# Patient Record
Sex: MALE | Race: WHITE | NOT HISPANIC OR LATINO | Employment: OTHER | RURAL
[De-identification: names, ages, dates, MRNs, and addresses within clinical notes are randomized per-mention and may not be internally consistent; named-entity substitution may affect disease eponyms.]

---

## 2017-01-05 ENCOUNTER — CONVERSION ENCOUNTER (OUTPATIENT)
Dept: FAMILY MEDICINE CLINIC | Facility: CLINIC | Age: 82
End: 2017-01-05

## 2017-01-06 LAB
ALBUMIN SERPL-MCNC: 4.2 G/DL (ref 3.6–5.1)
ALP SERPL-CCNC: 86 U/L (ref 40–115)
ALT SERPL-CCNC: 9 U/L (ref 9–46)
AST SERPL-CCNC: 16 U/L (ref 10–35)
BASOPHILS # BLD AUTO: 25 CELLS/UL (ref 0–200)
BASOPHILS NFR BLD AUTO: 0.4 %
BILIRUB SERPL-MCNC: 0.5 MG/DL (ref 0.2–1.2)
BUN SERPL-MCNC: 19 MG/DL (ref 7–25)
BUN/CREAT SERPL: 13 (CALC) (ref 6–22)
CALCIUM SERPL-MCNC: 9.2 MG/DL (ref 8.6–10.3)
CHLORIDE SERPL-SCNC: 102 MMOL/L (ref 98–110)
CHOLEST SERPL-MCNC: 180 MG/DL (ref 125–200)
CHOLEST/HDLC SERPL: 5.6 (CALC)
CONV CO2: 31 MMOL/L (ref 20–31)
CONV TOTAL PROTEIN: 7.4 G/DL (ref 6.1–8.1)
CREAT UR-MCNC: 1.48 MG/DL (ref 0.7–1.11)
EOSINOPHIL # BLD AUTO: 202 CELLS/UL (ref 15–500)
EOSINOPHIL # BLD AUTO: 3.2 %
ERYTHROCYTE [DISTWIDTH] IN BLOOD BY AUTOMATED COUNT: 13.9 % (ref 11–15)
GLOBULIN UR ELPH-MCNC: 3.2 MG/DL (ref 1.9–3.7)
GLUCOSE UR QL: 84 MG/DL (ref 65–99)
HBA1C MFR BLD: 5.8 %
HCT VFR BLD AUTO: 38.5 % (ref 38.5–50)
HDLC SERPL-MCNC: 32 MG/DL
HGB BLD-MCNC: 13.1 G/DL (ref 13.2–17.1)
INSULIN SERPL-ACNC: 1.3 (CALC) (ref 1–2.5)
LDLC SERPL CALC-MCNC: 101 MG/DL
LYMPHOCYTES # BLD AUTO: 1777 CELLS/UL (ref 850–3900)
LYMPHOCYTES NFR BLD AUTO: 28.2 %
MCH RBC QN AUTO: 32.9 PG (ref 27–33)
MCHC RBC AUTO-ENTMCNC: 34.1 G/DL (ref 32–36)
MCV RBC AUTO: 96.4 FL (ref 80–100)
MONOCYTES # BLD AUTO: 554 CELLS/UL (ref 200–950)
MONOCYTES NFR BLD AUTO: 8.8 %
NEUTROPHILS # BLD AUTO: 3742 CELLS/UL (ref 1500–7800)
NEUTROPHILS NFR BLD AUTO: 59.4 %
NONHDLC SERPL-MCNC: 148 MG/DL
PLATELET # BLD AUTO: 150 10*3/UL (ref 140–400)
PMV BLD AUTO: 7.9 FL (ref 7.5–11.5)
POTASSIUM SERPL-SCNC: 4.2 MMOL/L (ref 3.5–5.3)
RBC # BLD AUTO: 4 MILLION/UL (ref 4.2–5.8)
SODIUM SERPL-SCNC: 140 MMOL/L (ref 135–146)
TRIGL SERPL-MCNC: 234 MG/DL
WBC # BLD AUTO: 6.3 10*3/UL (ref 3.8–10.8)

## 2017-04-13 ENCOUNTER — CONVERSION ENCOUNTER (OUTPATIENT)
Dept: FAMILY MEDICINE CLINIC | Facility: CLINIC | Age: 82
End: 2017-04-13

## 2017-04-14 LAB
ALBUMIN SERPL-MCNC: 3.6 G/DL (ref 3.6–5.1)
ALP SERPL-CCNC: 68 U/L (ref 40–115)
AST SERPL-CCNC: 19 U/L (ref 10–35)
BILIRUB SERPL-MCNC: 0.4 MG/DL (ref 0.2–1.2)
BUN SERPL-MCNC: 23 MG/DL (ref 7–25)
BUN/CREAT SERPL: 15.3 (CALC) (ref 6–22)
CALCIUM SERPL-MCNC: 9.4 MG/DL (ref 8.6–10.2)
CHLORIDE SERPL-SCNC: 105 MMOL/L (ref 98–110)
CHOLEST SERPL-MCNC: 169 MG/DL (ref 125–200)
CONV CO2: 29 MMOL/L (ref 21–33)
CONV TOTAL PROTEIN: 6.3 G/DL (ref 6.2–8.3)
CREAT UR-MCNC: 1.5 MG/DL (ref 0.67–1.54)
EOSINOPHIL # BLD AUTO: 100 CELLS/UL (ref 15–500)
EOSINOPHIL # BLD AUTO: 2 %
ERYTHROCYTE [DISTWIDTH] IN BLOOD BY AUTOMATED COUNT: 13.9 % (ref 11–15)
GLOBULIN UR ELPH-MCNC: 2.7 MG/DL (ref 2.1–3.7)
GLUCOSE UR QL: 88 MG/DL (ref 65–99)
HCT VFR BLD AUTO: 36.2 % (ref 38.5–50)
HDLC SERPL-MCNC: 33 MG/DL
HGB BLD-MCNC: 12.5 G/DL (ref 13.2–17.1)
INSULIN SERPL-ACNC: 1.3 (CALC) (ref 1–2.1)
LDLC SERPL CALC-MCNC: 91 MG/DL
LYMPHOCYTES # BLD AUTO: 1800 CELLS/UL (ref 850–3900)
LYMPHOCYTES NFR BLD AUTO: 32 %
MCH RBC QN AUTO: 32.8 PG (ref 27–33)
MCHC RBC AUTO-ENTMCNC: 34.4 G/DL (ref 32–36)
MCV RBC AUTO: 95.5 FL (ref 80–100)
MONOCYTES # BLD AUTO: 600 CELLS/UL (ref 200–950)
MONOCYTES NFR BLD AUTO: 10 %
NEUTROPHILS # BLD AUTO: 3000 CELLS/UL (ref 1500–7800)
NEUTROPHILS NFR BLD AUTO: 55 %
PLATELET # BLD AUTO: 165 10*3/UL (ref 140–400)
PMV BLD AUTO: 8.5 FL (ref 7.5–11.5)
POTASSIUM SERPL-SCNC: 4.2 MMOL/L (ref 3.5–5.3)
PSA SERPL-MCNC: 11.2 NG/ML
RBC # BLD AUTO: 3.8 MILLION/UL (ref 4.2–5.8)
SODIUM SERPL-SCNC: 145 MMOL/L (ref 135–146)
TRIGL SERPL-MCNC: 227 MG/DL
WBC # BLD AUTO: 5.5 10*3/UL (ref 3.8–10.8)

## 2017-06-22 ENCOUNTER — APPOINTMENT (OUTPATIENT)
Dept: PREADMISSION TESTING | Facility: HOSPITAL | Age: 82
End: 2017-06-22

## 2017-06-22 VITALS
DIASTOLIC BLOOD PRESSURE: 70 MMHG | HEART RATE: 64 BPM | TEMPERATURE: 97.6 F | BODY MASS INDEX: 26.22 KG/M2 | SYSTOLIC BLOOD PRESSURE: 113 MMHG | WEIGHT: 173 LBS | HEIGHT: 68 IN | OXYGEN SATURATION: 98 % | RESPIRATION RATE: 20 BRPM

## 2017-06-22 LAB
ANION GAP SERPL CALCULATED.3IONS-SCNC: 13.9 MMOL/L
BUN BLD-MCNC: 21 MG/DL (ref 8–23)
BUN/CREAT SERPL: 15.6 (ref 7–25)
CALCIUM SPEC-SCNC: 9.6 MG/DL (ref 8.2–9.6)
CHLORIDE SERPL-SCNC: 101 MMOL/L (ref 98–107)
CO2 SERPL-SCNC: 26.1 MMOL/L (ref 22–29)
CREAT BLD-MCNC: 1.35 MG/DL (ref 0.76–1.27)
DEPRECATED RDW RBC AUTO: 48.8 FL (ref 37–54)
ERYTHROCYTE [DISTWIDTH] IN BLOOD BY AUTOMATED COUNT: 13.7 % (ref 11.5–14.5)
GFR SERPL CREATININE-BSD FRML MDRD: 49 ML/MIN/1.73
GLUCOSE BLD-MCNC: 73 MG/DL (ref 65–99)
HCT VFR BLD AUTO: 36.4 % (ref 40.4–52.2)
HGB BLD-MCNC: 12.5 G/DL (ref 13.7–17.6)
MCH RBC QN AUTO: 33.4 PG (ref 27–32.7)
MCHC RBC AUTO-ENTMCNC: 34.3 G/DL (ref 32.6–36.4)
MCV RBC AUTO: 97.3 FL (ref 79.8–96.2)
PLATELET # BLD AUTO: 212 10*3/MM3 (ref 140–500)
PMV BLD AUTO: 9.3 FL (ref 6–12)
POTASSIUM BLD-SCNC: 4.2 MMOL/L (ref 3.5–5.2)
RBC # BLD AUTO: 3.74 10*6/MM3 (ref 4.6–6)
SODIUM BLD-SCNC: 141 MMOL/L (ref 136–145)
WBC NRBC COR # BLD: 5.84 10*3/MM3 (ref 4.5–10.7)

## 2017-06-22 PROCEDURE — 36415 COLL VENOUS BLD VENIPUNCTURE: CPT

## 2017-06-22 PROCEDURE — 85027 COMPLETE CBC AUTOMATED: CPT | Performed by: OTOLARYNGOLOGY

## 2017-06-22 PROCEDURE — 93010 ELECTROCARDIOGRAM REPORT: CPT | Performed by: INTERNAL MEDICINE

## 2017-06-22 PROCEDURE — 93005 ELECTROCARDIOGRAM TRACING: CPT

## 2017-06-22 PROCEDURE — 80048 BASIC METABOLIC PNL TOTAL CA: CPT | Performed by: OTOLARYNGOLOGY

## 2017-06-22 RX ORDER — CARBOXYMETHYLCELLULOSE SODIUM 5 MG/ML
1 SOLUTION/ DROPS OPHTHALMIC 2 TIMES DAILY PRN
COMMUNITY
End: 2021-08-25

## 2017-06-22 RX ORDER — ROSUVASTATIN CALCIUM 40 MG/1
40 TABLET, COATED ORAL 2 TIMES WEEKLY
COMMUNITY
End: 2020-02-13 | Stop reason: SDUPTHER

## 2017-06-22 RX ORDER — ASPIRIN 81 MG/1
81 TABLET ORAL EVERY MORNING
COMMUNITY
End: 2017-06-29 | Stop reason: HOSPADM

## 2017-06-22 RX ORDER — FLUTICASONE PROPIONATE 50 MCG
2 SPRAY, SUSPENSION (ML) NASAL EVERY MORNING
COMMUNITY
End: 2022-01-01 | Stop reason: SDUPTHER

## 2017-06-22 RX ORDER — PANTOPRAZOLE SODIUM 40 MG/1
40 TABLET, DELAYED RELEASE ORAL EVERY MORNING
COMMUNITY
End: 2019-11-04 | Stop reason: SDUPTHER

## 2017-06-22 RX ORDER — HYDROCHLOROTHIAZIDE 50 MG/1
50 TABLET ORAL EVERY MORNING
COMMUNITY
End: 2020-01-01

## 2017-06-22 RX ORDER — DIVALPROEX SODIUM 125 MG/1
125 TABLET, DELAYED RELEASE ORAL EVERY MORNING
COMMUNITY
End: 2019-09-08 | Stop reason: SDUPTHER

## 2017-06-22 RX ORDER — MONTELUKAST SODIUM 10 MG/1
10 TABLET ORAL EVERY MORNING
COMMUNITY
End: 2020-02-13 | Stop reason: SDUPTHER

## 2017-06-22 RX ORDER — QUETIAPINE FUMARATE 25 MG/1
25 TABLET, FILM COATED ORAL NIGHTLY
COMMUNITY
End: 2019-10-07 | Stop reason: SDUPTHER

## 2017-06-22 RX ORDER — GALANTAMINE HYDROBROMIDE 4 MG/1
4 TABLET, FILM COATED ORAL 2 TIMES DAILY
COMMUNITY
End: 2020-01-01

## 2017-06-22 RX ORDER — MEMANTINE HYDROCHLORIDE 5 MG/1
5 TABLET ORAL 2 TIMES DAILY
COMMUNITY
End: 2020-01-01

## 2017-06-22 RX ORDER — POTASSIUM CHLORIDE 750 MG/1
10 TABLET, FILM COATED, EXTENDED RELEASE ORAL EVERY MORNING
COMMUNITY
End: 2020-02-13 | Stop reason: SDUPTHER

## 2017-06-22 NOTE — DISCHARGE INSTRUCTIONS
Take the following medications the morning of surgery with a small sip of water:  Protonix, metoprolol,        General Instructions:  • Do not eat solid food after midnight the night before surgery.  • You may drink clear liquids day of surgery but must stop at least one hour before your hospital arrival time.  • It is beneficial for you to have a clear drink that contains carbohydrates the day of surgery.  We suggest a 20 ounce bottle of Gatorade or Powerade for non-diabetic patients or a 20 ounce bottle of G2 or Powerade Zero for diabetic patients. (Pediatric patients, are not advised to drink a 20 ounce carbohydrate drink)    Clear liquids are liquids you can see through.  Nothing red in color.     Plain water                               Sports drinks  Sodas                                   Gelatin (Jell-O)  Fruit juices without pulp such as white grape juice and apple juice  Popsicles that contain no fruit or yogurt  Tea or coffee (no cream or milk added)  Gatorade / Powerade  G2 / Powerade Zero    • Infants may have breast milk up to four hours before surgery.  • Infants drinking formula may drink formula up to six hours before surgery.   • Patients who avoid smoking, chewing tobacco and alcohol for 4 weeks prior to surgery have a reduced risk of post-operative complications.  Quit smoking as many days before surgery as you can.  • Do not smoke, use chewing tobacco or drink alcohol the day of surgery.   • If applicable bring your C-PAP/ BI-PAP machine.  • Bring any papers given to you in the doctor’s office.  • Wear clean comfortable clothes and socks.  • Do not wear contact lenses or make-up.  Bring a case for your glasses.   • Bring crutches or walker if applicable.  • Leave all other valuables and jewelry at home.  • The Pre-Admission Testing nurse will instruct you to bring medications if unable to obtain an accurate list in Pre-Admission Testing.        If you were given a blood bank ID arm band  remember to bring it with you the day of surgery.    Preventing a Surgical Site Infection:  • For 2 to 3 days before surgery, avoid shaving with a razor because the razor can irritate skin and make it easier to develop an infection.  • The night prior to surgery sleep in a clean bed with clean clothing.  Do not allow pets to sleep with you.  • Shower on the morning of surgery using a fresh bar of anti-bacterial soap (such as Dial) and clean washcloth.  Dry with a clean towel and dress in clean clothing.  • Ask your surgeon if you will be receiving antibiotics prior to surgery.  • Make sure you, your family, and all healthcare providers clean their hands with soap and water or an alcohol based hand  before caring for you or your wound.    Day of surgery:  Upon arrival, a Pre-op nurse and Anesthesiologist will review your health history, obtain vital signs, and answer questions you may have.  The only belongings needed at this time will be your home medications and if applicable your C-PAP/BI-PAP machine.  If you are staying overnight your family can leave the rest of your belongings in the car and bring them to your room later.  A Pre-op nurse will start an IV and you may receive medication in preparation for surgery, including something to help you relax.  Your family will be able to see you in the Pre-op area.  While you are in surgery your family should notify the waiting room  if they leave the waiting room area and provide a contact phone number.    Please be aware that surgery does come with discomfort.  We want to make every effort to control your discomfort so please discuss any uncontrolled symptoms with your nurse.   Your doctor will most likely have prescribed pain medications.      If you are going home after surgery you will receive individualized written care instructions before being discharged.  A responsible adult must drive you to and from the hospital on the day of your surgery  and stay with you for 24 hours.    If you are staying overnight following surgery, you will be transported to your hospital room following the recovery period.  [facility] has all private rooms.    If you have any questions please call Pre-Admission Testing at [phone].  Deductibles and co-payments are collected on the day of service. Please be prepared to pay the required co-pay, deductible or deposit on the day of service as defined by your plan.

## 2017-06-29 ENCOUNTER — HOSPITAL ENCOUNTER (OUTPATIENT)
Facility: HOSPITAL | Age: 82
Setting detail: HOSPITAL OUTPATIENT SURGERY
Discharge: HOME | End: 2017-06-29
Attending: OTOLARYNGOLOGY | Admitting: OTOLARYNGOLOGY

## 2017-06-29 ENCOUNTER — ANESTHESIA EVENT (OUTPATIENT)
Dept: PERIOP | Facility: HOSPITAL | Age: 82
End: 2017-06-29

## 2017-06-29 ENCOUNTER — ANESTHESIA (OUTPATIENT)
Dept: PERIOP | Facility: HOSPITAL | Age: 82
End: 2017-06-29

## 2017-06-29 VITALS
TEMPERATURE: 97 F | HEART RATE: 62 BPM | DIASTOLIC BLOOD PRESSURE: 78 MMHG | RESPIRATION RATE: 18 BRPM | SYSTOLIC BLOOD PRESSURE: 122 MMHG | OXYGEN SATURATION: 93 %

## 2017-06-29 DIAGNOSIS — C44.91 RECURRENT BASAL CELL CARCINOMA: ICD-10-CM

## 2017-06-29 PROCEDURE — 25010000002 PROPOFOL 10 MG/ML EMULSION: Performed by: NURSE ANESTHETIST, CERTIFIED REGISTERED

## 2017-06-29 PROCEDURE — 88332 PATH CONSLTJ SURG EA ADD BLK: CPT | Performed by: OTOLARYNGOLOGY

## 2017-06-29 PROCEDURE — 25010000002 ONDANSETRON PER 1 MG: Performed by: NURSE ANESTHETIST, CERTIFIED REGISTERED

## 2017-06-29 PROCEDURE — 88331 PATH CONSLTJ SURG 1 BLK 1SPC: CPT | Performed by: OTOLARYNGOLOGY

## 2017-06-29 PROCEDURE — 25010000002 PHENYLEPHRINE PER 1 ML: Performed by: NURSE ANESTHETIST, CERTIFIED REGISTERED

## 2017-06-29 PROCEDURE — 88305 TISSUE EXAM BY PATHOLOGIST: CPT | Performed by: OTOLARYNGOLOGY

## 2017-06-29 RX ORDER — ROCURONIUM BROMIDE 10 MG/ML
INJECTION, SOLUTION INTRAVENOUS AS NEEDED
Status: DISCONTINUED | OUTPATIENT
Start: 2017-06-29 | End: 2017-06-29 | Stop reason: SURG

## 2017-06-29 RX ORDER — HYDROCODONE BITARTRATE AND ACETAMINOPHEN 7.5; 325 MG/1; MG/1
1 TABLET ORAL ONCE AS NEEDED
Status: DISCONTINUED | OUTPATIENT
Start: 2017-06-29 | End: 2017-06-29 | Stop reason: HOSPADM

## 2017-06-29 RX ORDER — LIDOCAINE HYDROCHLORIDE 20 MG/ML
INJECTION, SOLUTION INFILTRATION; PERINEURAL AS NEEDED
Status: DISCONTINUED | OUTPATIENT
Start: 2017-06-29 | End: 2017-06-29 | Stop reason: SURG

## 2017-06-29 RX ORDER — FAMOTIDINE 10 MG/ML
20 INJECTION, SOLUTION INTRAVENOUS ONCE
Status: COMPLETED | OUTPATIENT
Start: 2017-06-29 | End: 2017-06-29

## 2017-06-29 RX ORDER — FENTANYL CITRATE 50 UG/ML
50 INJECTION, SOLUTION INTRAMUSCULAR; INTRAVENOUS
Status: DISCONTINUED | OUTPATIENT
Start: 2017-06-29 | End: 2017-06-29 | Stop reason: HOSPADM

## 2017-06-29 RX ORDER — PROPOFOL 10 MG/ML
VIAL (ML) INTRAVENOUS AS NEEDED
Status: DISCONTINUED | OUTPATIENT
Start: 2017-06-29 | End: 2017-06-29 | Stop reason: SURG

## 2017-06-29 RX ORDER — NALOXONE HCL 0.4 MG/ML
0.2 VIAL (ML) INJECTION AS NEEDED
Status: DISCONTINUED | OUTPATIENT
Start: 2017-06-29 | End: 2017-06-29 | Stop reason: HOSPADM

## 2017-06-29 RX ORDER — FENTANYL CITRATE 50 UG/ML
25 INJECTION, SOLUTION INTRAMUSCULAR; INTRAVENOUS
Status: DISCONTINUED | OUTPATIENT
Start: 2017-06-29 | End: 2017-06-29 | Stop reason: HOSPADM

## 2017-06-29 RX ORDER — EPHEDRINE SULFATE 50 MG/ML
INJECTION, SOLUTION INTRAVENOUS AS NEEDED
Status: DISCONTINUED | OUTPATIENT
Start: 2017-06-29 | End: 2017-06-29 | Stop reason: SURG

## 2017-06-29 RX ORDER — DIPHENHYDRAMINE HYDROCHLORIDE 50 MG/ML
12.5 INJECTION INTRAMUSCULAR; INTRAVENOUS
Status: DISCONTINUED | OUTPATIENT
Start: 2017-06-29 | End: 2017-06-29 | Stop reason: HOSPADM

## 2017-06-29 RX ORDER — PROMETHAZINE HYDROCHLORIDE 25 MG/1
6.25 TABLET ORAL ONCE AS NEEDED
Status: DISCONTINUED | OUTPATIENT
Start: 2017-06-29 | End: 2017-06-29 | Stop reason: HOSPADM

## 2017-06-29 RX ORDER — ERYTHROMYCIN 5 MG/G
OINTMENT OPHTHALMIC AS NEEDED
Status: DISCONTINUED | OUTPATIENT
Start: 2017-06-29 | End: 2017-06-29 | Stop reason: HOSPADM

## 2017-06-29 RX ORDER — LABETALOL HYDROCHLORIDE 5 MG/ML
5 INJECTION, SOLUTION INTRAVENOUS
Status: DISCONTINUED | OUTPATIENT
Start: 2017-06-29 | End: 2017-06-29 | Stop reason: HOSPADM

## 2017-06-29 RX ORDER — MAGNESIUM HYDROXIDE 1200 MG/15ML
LIQUID ORAL AS NEEDED
Status: DISCONTINUED | OUTPATIENT
Start: 2017-06-29 | End: 2017-06-29 | Stop reason: HOSPADM

## 2017-06-29 RX ORDER — ERYTHROMYCIN 5 MG/G
OINTMENT OPHTHALMIC
Qty: 3.5 G | Refills: 1 | Status: SHIPPED | OUTPATIENT
Start: 2017-06-29 | End: 2021-08-25

## 2017-06-29 RX ORDER — SODIUM CHLORIDE 0.9 % (FLUSH) 0.9 %
1-10 SYRINGE (ML) INJECTION AS NEEDED
Status: DISCONTINUED | OUTPATIENT
Start: 2017-06-29 | End: 2017-06-29 | Stop reason: HOSPADM

## 2017-06-29 RX ORDER — PROMETHAZINE HYDROCHLORIDE 25 MG/1
12.5 TABLET ORAL ONCE AS NEEDED
Status: DISCONTINUED | OUTPATIENT
Start: 2017-06-29 | End: 2017-06-29 | Stop reason: HOSPADM

## 2017-06-29 RX ORDER — EPHEDRINE SULFATE 50 MG/ML
5 INJECTION, SOLUTION INTRAVENOUS ONCE AS NEEDED
Status: DISCONTINUED | OUTPATIENT
Start: 2017-06-29 | End: 2017-06-29 | Stop reason: HOSPADM

## 2017-06-29 RX ORDER — HYDROCODONE BITARTRATE AND ACETAMINOPHEN 5; 325 MG/1; MG/1
1 TABLET ORAL EVERY 6 HOURS PRN
Qty: 15 TABLET | Refills: 0 | Status: SHIPPED | OUTPATIENT
Start: 2017-06-29 | End: 2021-04-01

## 2017-06-29 RX ORDER — GLYCOPYRROLATE 0.2 MG/ML
INJECTION INTRAMUSCULAR; INTRAVENOUS AS NEEDED
Status: DISCONTINUED | OUTPATIENT
Start: 2017-06-29 | End: 2017-06-29 | Stop reason: SURG

## 2017-06-29 RX ORDER — SODIUM CHLORIDE, SODIUM LACTATE, POTASSIUM CHLORIDE, CALCIUM CHLORIDE 600; 310; 30; 20 MG/100ML; MG/100ML; MG/100ML; MG/100ML
9 INJECTION, SOLUTION INTRAVENOUS CONTINUOUS
Status: DISCONTINUED | OUTPATIENT
Start: 2017-06-29 | End: 2017-06-29 | Stop reason: HOSPADM

## 2017-06-29 RX ORDER — ONDANSETRON 2 MG/ML
4 INJECTION INTRAMUSCULAR; INTRAVENOUS ONCE AS NEEDED
Status: DISCONTINUED | OUTPATIENT
Start: 2017-06-29 | End: 2017-06-29 | Stop reason: HOSPADM

## 2017-06-29 RX ORDER — PROMETHAZINE HYDROCHLORIDE 25 MG/ML
6.25 INJECTION, SOLUTION INTRAMUSCULAR; INTRAVENOUS ONCE AS NEEDED
Status: DISCONTINUED | OUTPATIENT
Start: 2017-06-29 | End: 2017-06-29 | Stop reason: HOSPADM

## 2017-06-29 RX ORDER — HYDROMORPHONE HYDROCHLORIDE 1 MG/ML
0.5 INJECTION, SOLUTION INTRAMUSCULAR; INTRAVENOUS; SUBCUTANEOUS
Status: DISCONTINUED | OUTPATIENT
Start: 2017-06-29 | End: 2017-06-29 | Stop reason: HOSPADM

## 2017-06-29 RX ORDER — FLUMAZENIL 0.1 MG/ML
0.2 INJECTION INTRAVENOUS AS NEEDED
Status: DISCONTINUED | OUTPATIENT
Start: 2017-06-29 | End: 2017-06-29 | Stop reason: HOSPADM

## 2017-06-29 RX ORDER — PROMETHAZINE HYDROCHLORIDE 25 MG/ML
5 INJECTION, SOLUTION INTRAMUSCULAR; INTRAVENOUS
Status: DISCONTINUED | OUTPATIENT
Start: 2017-06-29 | End: 2017-06-29 | Stop reason: HOSPADM

## 2017-06-29 RX ORDER — HYDRALAZINE HYDROCHLORIDE 20 MG/ML
5 INJECTION INTRAMUSCULAR; INTRAVENOUS
Status: DISCONTINUED | OUTPATIENT
Start: 2017-06-29 | End: 2017-06-29 | Stop reason: HOSPADM

## 2017-06-29 RX ORDER — MIDAZOLAM HYDROCHLORIDE 1 MG/ML
2 INJECTION INTRAMUSCULAR; INTRAVENOUS
Status: DISCONTINUED | OUTPATIENT
Start: 2017-06-29 | End: 2017-06-29 | Stop reason: HOSPADM

## 2017-06-29 RX ORDER — MIDAZOLAM HYDROCHLORIDE 1 MG/ML
1 INJECTION INTRAMUSCULAR; INTRAVENOUS
Status: DISCONTINUED | OUTPATIENT
Start: 2017-06-29 | End: 2017-06-29 | Stop reason: HOSPADM

## 2017-06-29 RX ORDER — ONDANSETRON 2 MG/ML
INJECTION INTRAMUSCULAR; INTRAVENOUS AS NEEDED
Status: DISCONTINUED | OUTPATIENT
Start: 2017-06-29 | End: 2017-06-29 | Stop reason: SURG

## 2017-06-29 RX ORDER — PROMETHAZINE HYDROCHLORIDE 12.5 MG/1
6.25 SUPPOSITORY RECTAL ONCE AS NEEDED
Status: DISCONTINUED | OUTPATIENT
Start: 2017-06-29 | End: 2017-06-29 | Stop reason: HOSPADM

## 2017-06-29 RX ADMIN — GLYCOPYRROLATE 0.4 MG: 0.2 INJECTION INTRAMUSCULAR; INTRAVENOUS at 10:53

## 2017-06-29 RX ADMIN — LIDOCAINE HYDROCHLORIDE 60 MG: 20 INJECTION, SOLUTION INFILTRATION; PERINEURAL at 09:56

## 2017-06-29 RX ADMIN — PHENYLEPHRINE HYDROCHLORIDE 100 MCG: 10 INJECTION INTRAVENOUS at 10:30

## 2017-06-29 RX ADMIN — PHENYLEPHRINE HYDROCHLORIDE 100 MCG: 10 INJECTION INTRAVENOUS at 11:30

## 2017-06-29 RX ADMIN — PROPOFOL 100 MG: 10 INJECTION, EMULSION INTRAVENOUS at 09:56

## 2017-06-29 RX ADMIN — PHENYLEPHRINE HYDROCHLORIDE 100 MCG: 10 INJECTION INTRAVENOUS at 10:10

## 2017-06-29 RX ADMIN — FAMOTIDINE 20 MG: 10 INJECTION, SOLUTION INTRAVENOUS at 08:41

## 2017-06-29 RX ADMIN — PHENYLEPHRINE HYDROCHLORIDE 100 MCG: 10 INJECTION INTRAVENOUS at 11:20

## 2017-06-29 RX ADMIN — ONDANSETRON 4 MG: 2 INJECTION INTRAMUSCULAR; INTRAVENOUS at 11:05

## 2017-06-29 RX ADMIN — PHENYLEPHRINE HYDROCHLORIDE 100 MCG: 10 INJECTION INTRAVENOUS at 11:10

## 2017-06-29 RX ADMIN — PHENYLEPHRINE HYDROCHLORIDE 100 MCG: 10 INJECTION INTRAVENOUS at 10:45

## 2017-06-29 RX ADMIN — SODIUM CHLORIDE, POTASSIUM CHLORIDE, SODIUM LACTATE AND CALCIUM CHLORIDE 9 ML/HR: 600; 310; 30; 20 INJECTION, SOLUTION INTRAVENOUS at 08:19

## 2017-06-29 RX ADMIN — PHENYLEPHRINE HYDROCHLORIDE 100 MCG: 10 INJECTION INTRAVENOUS at 11:04

## 2017-06-29 RX ADMIN — EPHEDRINE SULFATE 10 MG: 50 INJECTION INTRAMUSCULAR; INTRAVENOUS; SUBCUTANEOUS at 10:17

## 2017-06-29 RX ADMIN — ROCURONIUM BROMIDE 25 MG: 10 INJECTION INTRAVENOUS at 09:56

## 2017-06-29 RX ADMIN — PHENYLEPHRINE HYDROCHLORIDE 100 MCG: 10 INJECTION INTRAVENOUS at 11:40

## 2017-06-29 NOTE — ANESTHESIA PROCEDURE NOTES
Airway  Urgency: elective    Airway not difficult    General Information and Staff    Patient location during procedure: OR  Anesthesiologist: ANABEL REVELES  CRNA: KI VALENCIA    Indications and Patient Condition  Indications for airway management: airway protection    Preoxygenated: yes  Mask difficulty assessment: 1 - vent by mask    Final Airway Details  Final airway type: endotracheal airway      Successful airway: ETT  Cuffed: yes   Successful intubation technique: direct laryngoscopy  Facilitating devices/methods: intubating stylet  Endotracheal tube insertion site: oral  Blade: Wick  Blade size: #2  ETT size: 7.5 mm  Cormack-Lehane Classification: grade I - full view of glottis  Placement verified by: chest auscultation and capnometry   Measured from: lips  ETT to lips (cm): 22  Number of attempts at approach: 1    Additional Comments  Smooth iv induction with no complications

## 2017-06-29 NOTE — ANESTHESIA PREPROCEDURE EVALUATION
Anesthesia Evaluation     Patient summary reviewed and Nursing notes reviewed   NPO Solid Status: > 8 hours  NPO Liquid Status: > 2 hours     Airway   Mallampati: II  no difficulty expected  Dental - normal exam     Pulmonary     breath sounds clear to auscultation  Cardiovascular     ECG reviewed  Patient on routine beta blocker  Rhythm: regular  Rate: normal    (+) hypertension,       Neuro/Psych  GI/Hepatic/Renal/Endo    (+)  GERD,     Musculoskeletal     Abdominal    Substance History      OB/GYN          Other      history of cancer                                    Anesthesia Plan    ASA 3     general     intravenous induction   Anesthetic plan and risks discussed with patient.    Plan discussed with CRNA.

## 2017-06-29 NOTE — ANESTHESIA POSTPROCEDURE EVALUATION
Patient: Diego Galan    Procedure Summary     Date Anesthesia Start Anesthesia Stop Room / Location    06/29/17 0939 1232  [facility]  /  [facility]       Procedure Diagnosis Surgeon Provider    LEFT LOWER LID CICATRICIAL ECTROPION OF  REPAIR WITH FULL THICKNESS SKIN GRAFT, EXCISION LESION 4CM x 2 CM WITH ROTATIONAL FLAP LEFT CHEEK 15 CM x 6 CM WITH FROZEN SECTIONS (Left Eye) No diagnosis on file. MD Vish Rodríguez MD          Anesthesia Type: general  Last vitals  /82 (06/29/17 1245)    Temp 36.7 °C (98 °F) (06/29/17 1219)    Pulse 59 (06/29/17 1245)   Resp 20 (06/29/17 1245)    SpO2 97 % (06/29/17 1245)      Post Anesthesia Care and Evaluation    Patient location during evaluation: bedside  Patient participation: complete - patient participated  Level of consciousness: awake  Pain score: 2  Pain management: adequate  Airway patency: patent  Anesthetic complications: No anesthetic complications    Cardiovascular status: acceptable  Respiratory status: acceptable  Hydration status: acceptable

## 2017-06-30 LAB
CYTO UR: NORMAL
LAB AP CASE REPORT: NORMAL
Lab: NORMAL
Lab: NORMAL
PATH REPORT.FINAL DX SPEC: NORMAL
PATH REPORT.GROSS SPEC: NORMAL

## 2017-07-06 ENCOUNTER — ANESTHESIA EVENT (OUTPATIENT)
Dept: PERIOP | Facility: HOSPITAL | Age: 82
End: 2017-07-06

## 2017-07-06 ENCOUNTER — ANESTHESIA (OUTPATIENT)
Dept: PERIOP | Facility: HOSPITAL | Age: 82
End: 2017-07-06

## 2017-07-06 ENCOUNTER — HOSPITAL ENCOUNTER (OUTPATIENT)
Facility: HOSPITAL | Age: 82
Setting detail: HOSPITAL OUTPATIENT SURGERY
Discharge: HOME | End: 2017-07-06
Attending: OTOLARYNGOLOGY | Admitting: OTOLARYNGOLOGY

## 2017-07-06 VITALS
TEMPERATURE: 97.4 F | DIASTOLIC BLOOD PRESSURE: 77 MMHG | HEART RATE: 65 BPM | RESPIRATION RATE: 16 BRPM | SYSTOLIC BLOOD PRESSURE: 132 MMHG | OXYGEN SATURATION: 97 %

## 2017-07-06 PROCEDURE — 25010000002 PROPOFOL 10 MG/ML EMULSION: Performed by: NURSE ANESTHETIST, CERTIFIED REGISTERED

## 2017-07-06 PROCEDURE — 25010000002 ONDANSETRON PER 1 MG: Performed by: NURSE ANESTHETIST, CERTIFIED REGISTERED

## 2017-07-06 PROCEDURE — 25010000002 FENTANYL CITRATE (PF) 100 MCG/2ML SOLUTION: Performed by: ANESTHESIOLOGY

## 2017-07-06 RX ORDER — FAMOTIDINE 10 MG/ML
20 INJECTION, SOLUTION INTRAVENOUS ONCE
Status: COMPLETED | OUTPATIENT
Start: 2017-07-06 | End: 2017-07-06

## 2017-07-06 RX ORDER — HYDRALAZINE HYDROCHLORIDE 20 MG/ML
5 INJECTION INTRAMUSCULAR; INTRAVENOUS
Status: DISCONTINUED | OUTPATIENT
Start: 2017-07-06 | End: 2017-07-06 | Stop reason: HOSPADM

## 2017-07-06 RX ORDER — GLYCOPYRROLATE 0.2 MG/ML
INJECTION INTRAMUSCULAR; INTRAVENOUS AS NEEDED
Status: DISCONTINUED | OUTPATIENT
Start: 2017-07-06 | End: 2017-07-06 | Stop reason: SURG

## 2017-07-06 RX ORDER — ERYTHROMYCIN 5 MG/G
OINTMENT OPHTHALMIC AS NEEDED
Status: DISCONTINUED | OUTPATIENT
Start: 2017-07-06 | End: 2017-07-06 | Stop reason: HOSPADM

## 2017-07-06 RX ORDER — MAGNESIUM HYDROXIDE 1200 MG/15ML
LIQUID ORAL AS NEEDED
Status: DISCONTINUED | OUTPATIENT
Start: 2017-07-06 | End: 2017-07-06 | Stop reason: HOSPADM

## 2017-07-06 RX ORDER — LIDOCAINE HYDROCHLORIDE 20 MG/ML
INJECTION, SOLUTION INFILTRATION; PERINEURAL AS NEEDED
Status: DISCONTINUED | OUTPATIENT
Start: 2017-07-06 | End: 2017-07-06 | Stop reason: SURG

## 2017-07-06 RX ORDER — MIDAZOLAM HYDROCHLORIDE 1 MG/ML
2 INJECTION INTRAMUSCULAR; INTRAVENOUS
Status: DISCONTINUED | OUTPATIENT
Start: 2017-07-06 | End: 2017-07-06 | Stop reason: HOSPADM

## 2017-07-06 RX ORDER — NALOXONE HCL 0.4 MG/ML
0.2 VIAL (ML) INJECTION AS NEEDED
Status: DISCONTINUED | OUTPATIENT
Start: 2017-07-06 | End: 2017-07-06 | Stop reason: HOSPADM

## 2017-07-06 RX ORDER — ONDANSETRON 2 MG/ML
4 INJECTION INTRAMUSCULAR; INTRAVENOUS ONCE AS NEEDED
Status: DISCONTINUED | OUTPATIENT
Start: 2017-07-06 | End: 2017-07-06 | Stop reason: HOSPADM

## 2017-07-06 RX ORDER — OXYCODONE AND ACETAMINOPHEN 7.5; 325 MG/1; MG/1
1 TABLET ORAL ONCE AS NEEDED
Status: DISCONTINUED | OUTPATIENT
Start: 2017-07-06 | End: 2017-07-06 | Stop reason: HOSPADM

## 2017-07-06 RX ORDER — PROMETHAZINE HYDROCHLORIDE 25 MG/1
25 SUPPOSITORY RECTAL ONCE AS NEEDED
Status: DISCONTINUED | OUTPATIENT
Start: 2017-07-06 | End: 2017-07-06 | Stop reason: HOSPADM

## 2017-07-06 RX ORDER — HYDROCODONE BITARTRATE AND ACETAMINOPHEN 7.5; 325 MG/1; MG/1
1 TABLET ORAL ONCE AS NEEDED
Status: DISCONTINUED | OUTPATIENT
Start: 2017-07-06 | End: 2017-07-06 | Stop reason: HOSPADM

## 2017-07-06 RX ORDER — HYDROCODONE BITARTRATE AND ACETAMINOPHEN 5; 325 MG/1; MG/1
1 TABLET ORAL ONCE AS NEEDED
Status: DISCONTINUED | OUTPATIENT
Start: 2017-07-06 | End: 2017-07-06 | Stop reason: HOSPADM

## 2017-07-06 RX ORDER — FENTANYL CITRATE 50 UG/ML
50 INJECTION, SOLUTION INTRAMUSCULAR; INTRAVENOUS
Status: DISCONTINUED | OUTPATIENT
Start: 2017-07-06 | End: 2017-07-06 | Stop reason: HOSPADM

## 2017-07-06 RX ORDER — FLUMAZENIL 0.1 MG/ML
0.2 INJECTION INTRAVENOUS AS NEEDED
Status: DISCONTINUED | OUTPATIENT
Start: 2017-07-06 | End: 2017-07-06 | Stop reason: HOSPADM

## 2017-07-06 RX ORDER — ERYTHROMYCIN 5 MG/G
OINTMENT OPHTHALMIC 3 TIMES DAILY
Qty: 3.5 G | Refills: 2 | Status: SHIPPED | OUTPATIENT
Start: 2017-07-06 | End: 2017-07-16

## 2017-07-06 RX ORDER — LABETALOL HYDROCHLORIDE 5 MG/ML
5 INJECTION, SOLUTION INTRAVENOUS
Status: DISCONTINUED | OUTPATIENT
Start: 2017-07-06 | End: 2017-07-06 | Stop reason: HOSPADM

## 2017-07-06 RX ORDER — PROMETHAZINE HYDROCHLORIDE 25 MG/ML
12.5 INJECTION, SOLUTION INTRAMUSCULAR; INTRAVENOUS ONCE AS NEEDED
Status: DISCONTINUED | OUTPATIENT
Start: 2017-07-06 | End: 2017-07-06 | Stop reason: HOSPADM

## 2017-07-06 RX ORDER — EPHEDRINE SULFATE 50 MG/ML
5 INJECTION, SOLUTION INTRAVENOUS ONCE AS NEEDED
Status: DISCONTINUED | OUTPATIENT
Start: 2017-07-06 | End: 2017-07-06 | Stop reason: HOSPADM

## 2017-07-06 RX ORDER — DIPHENHYDRAMINE HYDROCHLORIDE 50 MG/ML
12.5 INJECTION INTRAMUSCULAR; INTRAVENOUS
Status: DISCONTINUED | OUTPATIENT
Start: 2017-07-06 | End: 2017-07-06 | Stop reason: HOSPADM

## 2017-07-06 RX ORDER — HYDROCODONE BITARTRATE AND ACETAMINOPHEN 5; 325 MG/1; MG/1
1 TABLET ORAL EVERY 6 HOURS PRN
Qty: 15 TABLET | Refills: 0 | Status: SHIPPED | OUTPATIENT
Start: 2017-07-06 | End: 2021-04-01

## 2017-07-06 RX ORDER — HYDROMORPHONE HYDROCHLORIDE 1 MG/ML
0.5 INJECTION, SOLUTION INTRAMUSCULAR; INTRAVENOUS; SUBCUTANEOUS
Status: DISCONTINUED | OUTPATIENT
Start: 2017-07-06 | End: 2017-07-06 | Stop reason: HOSPADM

## 2017-07-06 RX ORDER — EPHEDRINE SULFATE 50 MG/ML
INJECTION, SOLUTION INTRAVENOUS AS NEEDED
Status: DISCONTINUED | OUTPATIENT
Start: 2017-07-06 | End: 2017-07-06 | Stop reason: SURG

## 2017-07-06 RX ORDER — SODIUM CHLORIDE, SODIUM LACTATE, POTASSIUM CHLORIDE, CALCIUM CHLORIDE 600; 310; 30; 20 MG/100ML; MG/100ML; MG/100ML; MG/100ML
9 INJECTION, SOLUTION INTRAVENOUS CONTINUOUS
Status: DISCONTINUED | OUTPATIENT
Start: 2017-07-06 | End: 2017-07-06 | Stop reason: HOSPADM

## 2017-07-06 RX ORDER — SODIUM CHLORIDE 0.9 % (FLUSH) 0.9 %
1-10 SYRINGE (ML) INJECTION AS NEEDED
Status: DISCONTINUED | OUTPATIENT
Start: 2017-07-06 | End: 2017-07-06 | Stop reason: HOSPADM

## 2017-07-06 RX ORDER — MIDAZOLAM HYDROCHLORIDE 1 MG/ML
1 INJECTION INTRAMUSCULAR; INTRAVENOUS
Status: DISCONTINUED | OUTPATIENT
Start: 2017-07-06 | End: 2017-07-06 | Stop reason: HOSPADM

## 2017-07-06 RX ORDER — PROMETHAZINE HYDROCHLORIDE 25 MG/1
12.5 TABLET ORAL ONCE AS NEEDED
Status: DISCONTINUED | OUTPATIENT
Start: 2017-07-06 | End: 2017-07-06 | Stop reason: HOSPADM

## 2017-07-06 RX ORDER — PROMETHAZINE HYDROCHLORIDE 25 MG/1
25 TABLET ORAL ONCE AS NEEDED
Status: DISCONTINUED | OUTPATIENT
Start: 2017-07-06 | End: 2017-07-06 | Stop reason: HOSPADM

## 2017-07-06 RX ORDER — ONDANSETRON 2 MG/ML
INJECTION INTRAMUSCULAR; INTRAVENOUS AS NEEDED
Status: DISCONTINUED | OUTPATIENT
Start: 2017-07-06 | End: 2017-07-06 | Stop reason: SURG

## 2017-07-06 RX ORDER — PROPOFOL 10 MG/ML
VIAL (ML) INTRAVENOUS AS NEEDED
Status: DISCONTINUED | OUTPATIENT
Start: 2017-07-06 | End: 2017-07-06 | Stop reason: SURG

## 2017-07-06 RX ADMIN — EPHEDRINE SULFATE 10 MG: 50 INJECTION INTRAMUSCULAR; INTRAVENOUS; SUBCUTANEOUS at 08:03

## 2017-07-06 RX ADMIN — LIDOCAINE HYDROCHLORIDE 40 MG: 20 INJECTION, SOLUTION INFILTRATION; PERINEURAL at 07:59

## 2017-07-06 RX ADMIN — SODIUM CHLORIDE, POTASSIUM CHLORIDE, SODIUM LACTATE AND CALCIUM CHLORIDE: 600; 310; 30; 20 INJECTION, SOLUTION INTRAVENOUS at 08:58

## 2017-07-06 RX ADMIN — PROPOFOL 150 MG: 10 INJECTION, EMULSION INTRAVENOUS at 07:59

## 2017-07-06 RX ADMIN — FENTANYL CITRATE 25 MCG: 50 INJECTION INTRAMUSCULAR; INTRAVENOUS at 08:04

## 2017-07-06 RX ADMIN — GLYCOPYRROLATE 0.2 MG: 0.2 INJECTION INTRAMUSCULAR; INTRAVENOUS at 07:58

## 2017-07-06 RX ADMIN — SODIUM CHLORIDE, POTASSIUM CHLORIDE, SODIUM LACTATE AND CALCIUM CHLORIDE 9 ML/HR: 600; 310; 30; 20 INJECTION, SOLUTION INTRAVENOUS at 06:15

## 2017-07-06 RX ADMIN — METOPROLOL TARTRATE 25 MG: 25 TABLET ORAL at 07:23

## 2017-07-06 RX ADMIN — FAMOTIDINE 20 MG: 10 INJECTION, SOLUTION INTRAVENOUS at 07:24

## 2017-07-06 RX ADMIN — HYDROCODONE BITARTRATE AND ACETAMINOPHEN 1 TABLET: 5; 325 TABLET ORAL at 09:50

## 2017-07-06 RX ADMIN — ONDANSETRON 4 MG: 2 INJECTION INTRAMUSCULAR; INTRAVENOUS at 08:10

## 2017-07-06 NOTE — PLAN OF CARE
Problem: Perioperative Period (Adult)  Goal: Signs and Symptoms of Listed Potential Problems Will be Absent or Manageable (Perioperative Period)  Outcome: Outcome(s) achieved Date Met:  07/06/17 07/06/17 1021   Perioperative Period   Problems Assessed (Perioperative Period) all

## 2017-07-06 NOTE — H&P
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      Review of Systems     Past Medical History:   Diagnosis Date   • Acoustic neuroma     left    • BPH (benign prostatic hyperplasia)    • Cancer     skin   • Ectropion of left lower eyelid    • Ectropion of right lower eyelid    • GERD (gastroesophageal reflux disease)    • Hearing loss of both ears    • History of kidney stones    • Hypertension        Objective      Vital Signs  Temp:  [97.8 °F (36.6 °C)] 97.8 °F (36.6 °C)  Heart Rate:  [68] 68  Resp:  [16] 16  BP: (130)/(72) 130/72    Physical Exam cicatricial ectropion RLL   Iii/vi BETTE  Chest clear    Results Review:   I reviewed the patient's new clinical results.      Assessment/Plan     Active Problems:    * No active hospital problems. *      Assessment & Plan Recommend repair of RLL cicatricial ectropion repair w/ FTSG and Frost suture    I discussed the patients findings and my recommendations with patient    Norman Long MD  07/06/17  7:31 AM    Time: Discharge 15 min and More than 50% of time spent in counseling and coordination of care:  Total face-to-face/floor time 15 min.  Time spent in counseling 15 min. Counseling included the following topics: surgical questions answered

## 2017-07-06 NOTE — ANESTHESIA PROCEDURE NOTES
Airway  Urgency: elective    Airway not difficult    General Information and Staff    Patient location during procedure: OR  Anesthesiologist: CJ LYNCH  CRNA: SOPHIE VALLE    Indications and Patient Condition  Indications for airway management: airway protection    Preoxygenated: yes  Mask difficulty assessment: 1 - vent by mask    Final Airway Details  Final airway type: supraglottic airway      Successful airway: unique  Size 5    Number of attempts at approach: 1    Additional Comments  Smooth IV/mask induction and placement of LMA. Atraumatic, lips/teeth/mouth intact, as preop. +ETCO2, bilateral breath sounds and equal.

## 2017-07-06 NOTE — PLAN OF CARE
Problem: Patient Care Overview (Adult)  Goal: Plan of Care Review  Outcome: Ongoing (interventions implemented as appropriate)    07/06/17 0628   Coping/Psychosocial Response Interventions   Plan Of Care Reviewed With patient;family   Patient Care Overview   Progress improving

## 2017-07-06 NOTE — PLAN OF CARE
Problem: Patient Care Overview (Adult)  Goal: Discharge Needs Assessment  Outcome: Ongoing (interventions implemented as appropriate)    07/06/17 0620   Discharge Needs Assessment   Concerns To Be Addressed no discharge needs identified

## 2017-07-06 NOTE — DISCHARGE SUMMARY
Pt doing well s/p repair of cicatricial ectropion RLL with FTSG and Frost suture  Pt will follow up in one week in office

## 2017-07-06 NOTE — BRIEF OP NOTE
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]    Procedure/CPT® Codes: repair of cicatrized eyelid with reconstruction, full thickness skin graft, and frost suture      Procedure(s):  RIGHT LOWER LID CICATRICIAL ECTROPION REPAIR,FULL THICKNESS SKIN GRAFT, SAAVEDRA SUTURE    Surgeon(s):  Norman Long MD    Anesthesia: General    Staff:   Circulator: Fang Tesfaye RN  Scrub Person: Joaquina Moraes    Estimated Blood Loss: *No blood loss documented*minimal  Urine Voided: * No values recorded between 7/6/2017  7:54 AM and 7/6/2017  9:16 AM *    Specimens:              none  * No specimens in log *      Drains: none          Findings: none    Complications: none      Norman Long MD     Date: 7/6/2017  Time: 9:32 AM

## 2017-07-06 NOTE — PLAN OF CARE
Problem: Perioperative Period (Adult)  Goal: Signs and Symptoms of Listed Potential Problems Will be Absent or Manageable (Perioperative Period)  Outcome: Ongoing (interventions implemented as appropriate)    07/06/17 0634   Perioperative Period   Problems Assessed (Perioperative Period) all   Problems Present (Perioperative Period) none

## 2017-07-06 NOTE — PLAN OF CARE
Problem: Patient Care Overview (Adult)  Goal: Adult Individualization and Mutuality  Outcome: Ongoing (interventions implemented as appropriate)    07/06/17 1690   Individualization   Patient Specific Interventions hard of hearing, hears best in right ear

## 2017-07-06 NOTE — PLAN OF CARE
Problem: Patient Care Overview (Adult)  Goal: Plan of Care Review  Outcome: Ongoing (interventions implemented as appropriate)    07/06/17 0953   Coping/Psychosocial Response Interventions   Plan Of Care Reviewed With patient   Patient Care Overview   Progress improving       Goal: Adult Individualization and Mutuality  Outcome: Ongoing (interventions implemented as appropriate)  Goal: Discharge Needs Assessment  Outcome: Ongoing (interventions implemented as appropriate)    07/06/17 0953   Discharge Needs Assessment   Concerns To Be Addressed no discharge needs identified         Problem: Perioperative Period (Adult)  Goal: Signs and Symptoms of Listed Potential Problems Will be Absent or Manageable (Perioperative Period)  Outcome: Ongoing (interventions implemented as appropriate)    07/06/17 0953   Perioperative Period   Problems Assessed (Perioperative Period) all   Problems Present (Perioperative Period) pain         07/06/17 0953   Perioperative Period   Problems Assessed (Perioperative Period) all   Problems Present (Perioperative Period) pain

## 2017-07-06 NOTE — PLAN OF CARE
Problem: Patient Care Overview (Adult)  Goal: Plan of Care Review  Outcome: Outcome(s) achieved Date Met:  07/06/17 07/06/17 1022   Coping/Psychosocial Response Interventions   Plan Of Care Reviewed With patient;family       Goal: Discharge Needs Assessment  Outcome: Outcome(s) achieved Date Met:  07/06/17 07/06/17 1022   Discharge Needs Assessment   Concerns To Be Addressed home safety concerns   Concerns Comments family concerned regarding pt.'s ability to see.    Self-Care   Equipment Currently Used at Home cane, straight

## 2017-07-06 NOTE — ANESTHESIA PREPROCEDURE EVALUATION
Anesthesia Evaluation            Airway   Mallampati: II  no difficulty expected  Dental          Pulmonary    Cardiovascular     ECG reviewed  Rhythm: regular  Rate: normal        Neuro/Psych  GI/Hepatic/Renal/Endo      Musculoskeletal     Abdominal    Substance History      OB/GYN          Other                                      Anesthesia Plan    ASA 3     general   (Some EKG changes   No new symptoms of cardio pulmonary disease however with patient's age likely there is some CAD   )  intravenous induction   Anesthetic plan and risks discussed with patient.

## 2017-07-06 NOTE — OP NOTE
OPERATIVE NOTE    Patient Identification:  Name: Diego Galan  Age: 89 y.o.  Sex: male  :  1921  MRN: [id number]                                               Preoperative diagnosis: right lower lid cicatricial ectropion   Postoperative diagnosis: same  Procedure: right lower lid cicatricial ectropion repair with full thickness skin graft and frost suture  Surgeon: Dr. Long   Assistants: none  Anesthesia: General  EBL: less than 50cc    Description of the procedure:     The patient was taken to the operating room and placed on the table in the supine position, where anesthesia was induced. 2% lidocaine with epinephrine and 0.5% marcaine in a 1:1 fashion was injected over the surgical site, and the patient was prepped and draped in the usual manner for orbitofacial surgery.     Corneal protectors were placed in both eyes.     A 15 Bard-Kamran blade incision was made at the right lateral canthus, and sharp dissection was carried laterally a distance of 5 mm. A second incision was made 2 mm below the eyelash margin, across the entire horizontal extent of the lower eyelid. Sharp dissection was carried out in a plane between the orbicularis muscle and the orbital septum until the entire lower lid was undermined. The orbital septum was opened horizontally, and the inferior retractor muscle layer was relaxed from the inferior border of the tarsal plate and dissected with sharp dissection. The inferior retractor muscle layer was recessed a distance of 6 mm. All cicatricial tissue was dissected with sharp dissection to allow the eyelid margin to mobilize superiorly. The lower lid was advanced superiorly and temporally, and the inferior ramus of the lateral canthal tendon was identified and severed with sharp dissection. A full-thickness en bloc excision of the lateral aspect of the eyelid margin was carried out with sharp dissection. The cut edge of the tarsal plate was anchored to the lateral orbital rim  periosteum with a 5-0 vicryl suture.     A full-thickness skin shortage of the lower eyelid was observed. A full-thickness skin graft was then harvested from behind the ipsilateral ear. The donor site was closed with 4-0 vicryl suture. The full-thickness skin graft was cut to fit the lower eyelid defect precisely and was sutured around the entire perimeter of the graft with 5-0 fast absorbing suture.     The corneal protectors were removed and antibiotic ophthalmic ointment was placed over the surgical site. The eyelid margin was held superiorly with a double-armed 4-0 silk tarsorrhaphy suture and anchored to the forehead to provide continuous upward traction on the eyelid.  A moderate pressure eyepad dressing was applied to the skin graft.    The patient was then awakened and taken from the operating room in good condition, having tolerated the procedure well. There were no complications, and the estimated blood loss was less than 50 cc.

## 2017-07-29 NOTE — OP NOTE
Patient Identification:  Name: Diego Galan  Age: 89 y.o.  Sex: male  :  1921  MRN: [id number]                 Preoperative diagnosis: Left lower lid cicatricial ectropion, Left cheek basal cell carcinoma  Postoperative diagnosis: same  Procedure: Left lower lid cicatricial ectropion repair with full thickness skin graft and frost suture, Left cheek lesion excision and repair with cheek advancement flap, Left frost suture  Surgeon: Dr. Norman Long  who was present and scrubbed throughout all critical portions of the operation  Assistants: Tello Stark MD  Anesthesia: General  EBL: 50cc     Description of the procedure:      The patient was taken to the operating room and placed on the table in the supine position, where anesthesia was induced. 2% lidocaine with epinephrine and 0.5% marcaine in a 1:1 fashion was injected over the surgical site, and the patient was prepped and draped in the usual manner for orbitofacial surgery.      Corneal protectors were placed in both eyes.      Attention was initially paid to the left cheek lesion which was noted near the nasojugal fold. Wide excision was performed due to history of multiple recurrences. A silk suture was placed at the 12:00 position and the lesion was sent for intraoperative frozen margins. Additional deep tissue was sent for a second intraoperative frozen specimen. The margins of the first lesion returned negative for basal cell carcinoma and there was no evidence of basal cell carcinoma within the second deep specimen. Therefore, repair was planned via a cheek advancement flap. Following excision of the lesion, the remaining defect measured 15cm x 6cm in size (90 square cm).     Next, the lower lid cicatricial ectropion was addressed. A 15 Bard-Kamran blade incision was made at the Left lateral canthus, and sharp dissection was carried to the lateral orbital rim. A second incision was made 2 mm below the eyelash margin, across the entire  horizontal extent of the lower eyelid. Sharp dissection was carried out in a plane between the orbicularis muscle and the orbital septum until the entire lower lid was undermined. All cicatricial tissue was dissected with sharp dissection to allow the eyelid margin to mobilize superiorly. The lower lid was advanced superiorly and temporally, and the inferior ramus of the lateral canthal tendon was identified and severed with sharp dissection. A full-thickness en bloc excision of the lateral aspect of the eyelid margin was carried out with sharp dissection. The cut edge of the tarsal plate was anchored to the lateral orbital rim periosteum with a 5-0 vicryl suture.     A full-thickness skin shortage of the lower eyelid was observed. The eyelid margin was held superiorly with a double-armed 4-0 Silk through the lower and upper eyelid and  anchored to the forehead to provide continuous upward traction on the eyelid.     Next the cheek advancement flap was planned and incised. A Mustarde-type flap was performed with incision and undermining widely of the tissue. The flap was rotated into position and held with 5-0 vicryl sutures. The surrounding edges were then closed with 5-0 fast absorbing suture.      The remaining defect of the lower lid was addressed with a full-thickness skin graft, which was then harvested from behind the ipsilateral ear. The donor site was closed with 4-0 vicryl suture. The full-thickness skin graft was cut to fit the lower eyelid defect with a slight overcorrection to allow for graft contraction. The graft was sutured around the entire perimeter of the graft with 5-0 fast absorbing suture.      The corneal protectors were removed and antibiotic ophthalmic ointment was placed over the surgical site.  The Silk polk suture was held superiorly with benzoin and tape. A moderate pressure eyepad dressing was applied to the skin graft.     The patient was then awakened and taken from the operating  room in good condition, having tolerated the procedure well. There were no complications, and the estimated blood loss was less than 50 cc.

## 2017-08-24 ENCOUNTER — CONVERSION ENCOUNTER (OUTPATIENT)
Dept: FAMILY MEDICINE CLINIC | Facility: CLINIC | Age: 82
End: 2017-08-24

## 2017-08-25 LAB
EOSINOPHIL # BLD AUTO: 200 CELLS/UL (ref 15–500)
EOSINOPHIL # BLD AUTO: 4 %
ERYTHROCYTE [DISTWIDTH] IN BLOOD BY AUTOMATED COUNT: 13.6 % (ref 11–15)
HCT VFR BLD AUTO: 34.5 % (ref 38.5–50)
HGB BLD-MCNC: 12.1 G/DL (ref 13.2–17.1)
LYMPHOCYTES # BLD AUTO: 1500 CELLS/UL (ref 850–3900)
LYMPHOCYTES NFR BLD AUTO: 29 %
MCH RBC QN AUTO: 33.9 PG (ref 27–33)
MCHC RBC AUTO-ENTMCNC: 35 G/DL (ref 32–36)
MCV RBC AUTO: 96.7 FL (ref 80–100)
MONOCYTES # BLD AUTO: 600 CELLS/UL (ref 200–950)
MONOCYTES NFR BLD AUTO: 11 %
NEUTROPHILS # BLD AUTO: 2800 CELLS/UL (ref 1500–7800)
NEUTROPHILS NFR BLD AUTO: 56 %
PLATELET # BLD AUTO: 202 10*3/UL (ref 140–400)
PMV BLD AUTO: 7.5 FL (ref 7.5–11.5)
RBC # BLD AUTO: 3.57 MILLION/UL (ref 4.2–5.8)
TSH SERPL-ACNC: 2.87 MIU/L (ref 0.4–4.5)
WBC # BLD AUTO: 5.1 10*3/UL (ref 3.8–10.8)

## 2017-08-30 ENCOUNTER — CONVERSION ENCOUNTER (OUTPATIENT)
Dept: FAMILY MEDICINE CLINIC | Facility: CLINIC | Age: 82
End: 2017-08-30

## 2017-08-31 LAB
CHOLEST SERPL-MCNC: 195 MG/DL (ref 125–200)
EOSINOPHIL # BLD AUTO: 200 CELLS/UL (ref 15–500)
ERYTHROCYTE [DISTWIDTH] IN BLOOD BY AUTOMATED COUNT: 13.6 % (ref 11–15)
FOLATE SERPL-MCNC: 18.5 NG/ML
HCT VFR BLD AUTO: 35.7 % (ref 38.5–50)
HDLC SERPL-MCNC: 35 MG/DL
HGB BLD-MCNC: 12.2 G/DL (ref 13.2–17.1)
LDLC SERPL CALC-MCNC: 113 MG/DL
LYMPHOCYTES # BLD AUTO: 1500 CELLS/UL (ref 850–3900)
LYMPHOCYTES NFR BLD AUTO: 26 %
MCH RBC QN AUTO: 33.3 PG (ref 27–33)
MCHC RBC AUTO-ENTMCNC: 34.2 G/DL (ref 32–36)
MCV RBC AUTO: 97.3 FL (ref 80–100)
MONOCYTES # BLD AUTO: 500 CELLS/UL (ref 200–950)
MONOCYTES NFR BLD AUTO: 9 %
NEUTROPHILS # BLD AUTO: 3500 CELLS/UL (ref 1500–7800)
NEUTROPHILS NFR BLD AUTO: 61 %
PLATELET # BLD AUTO: 197 10*3/UL (ref 140–400)
PMV BLD AUTO: 7.4 FL (ref 7.5–11.5)
RBC # BLD AUTO: 3.67 MILLION/UL (ref 4.2–5.8)
RETICS/RBC NFR MANUAL: NORMAL CELLS/UL (ref 25000–90000)
RETICULOCYTES PERCENT: 1.4 %
TRIGL SERPL-MCNC: 233 MG/DL
VIT B12 SERPL-MCNC: 285 PG/ML (ref 200–1100)
WBC # BLD AUTO: 5.8 10*3/UL (ref 3.8–10.8)

## 2017-10-18 ENCOUNTER — HOSPITAL ENCOUNTER (OUTPATIENT)
Dept: OTHER | Facility: HOSPITAL | Age: 82
Discharge: HOME | End: 2017-10-18
Attending: FAMILY MEDICINE | Admitting: FAMILY MEDICINE

## 2017-10-18 LAB — CREAT BLDA-MCNC: 1.5 MG/DL (ref 0.6–1.3)

## 2018-08-07 ENCOUNTER — CONVERSION ENCOUNTER (OUTPATIENT)
Dept: FAMILY MEDICINE CLINIC | Facility: CLINIC | Age: 83
End: 2018-08-07

## 2018-08-07 LAB
ALBUMIN SERPL-MCNC: 3.7 G/DL (ref 3.6–5.1)
ALP SERPL-CCNC: 78 U/L (ref 40–115)
AST SERPL-CCNC: 18 U/L (ref 10–35)
BILIRUB SERPL-MCNC: 0.5 MG/DL (ref 0.2–1.2)
BUN SERPL-MCNC: 23 MG/DL (ref 7–25)
BUN/CREAT SERPL: 15.3 (CALC) (ref 6–22)
CALCIUM SERPL-MCNC: 9.6 MG/DL (ref 8.6–10.2)
CHLORIDE SERPL-SCNC: 103 MMOL/L (ref 98–110)
CHOLEST SERPL-MCNC: 179 MG/DL (ref 125–200)
CONV CO2: 28 MMOL/L (ref 21–33)
CONV TOTAL PROTEIN: 6.8 G/DL (ref 6.2–8.3)
CREAT UR-MCNC: 1.5 MG/DL (ref 0.67–1.54)
EOSINOPHIL # BLD AUTO: 200 CELLS/UL (ref 15–500)
ERYTHROCYTE [DISTWIDTH] IN BLOOD BY AUTOMATED COUNT: 13.7 % (ref 11–15)
GLOBULIN UR ELPH-MCNC: 3.1 MG/DL (ref 2.1–3.7)
GLUCOSE UR QL: 88 MG/DL (ref 65–99)
HCT VFR BLD AUTO: 37.8 % (ref 38.5–50)
HDLC SERPL-MCNC: 30 MG/DL
HGB BLD-MCNC: 12.8 G/DL (ref 13.2–17.1)
INSULIN SERPL-ACNC: 1.2 (CALC) (ref 1–2.1)
LDLC SERPL CALC-MCNC: 95 MG/DL
LYMPHOCYTES # BLD AUTO: 1800 CELLS/UL (ref 850–3900)
LYMPHOCYTES NFR BLD AUTO: 28 %
MCH RBC QN AUTO: 32.5 PG (ref 27–33)
MCHC RBC AUTO-ENTMCNC: 33.7 G/DL (ref 32–36)
MCV RBC AUTO: 96.4 FL (ref 80–100)
MONOCYTES # BLD AUTO: 600 CELLS/UL (ref 200–950)
MONOCYTES NFR BLD AUTO: 10 %
NEUTROPHILS # BLD AUTO: 3800 CELLS/UL (ref 1500–7800)
NEUTROPHILS NFR BLD AUTO: 60 %
PLATELET # BLD AUTO: 196 10*3/UL (ref 140–400)
PMV BLD AUTO: 7.8 FL (ref 7.5–11.5)
POTASSIUM SERPL-SCNC: 4.2 MMOL/L (ref 3.5–5.3)
RBC # BLD AUTO: 3.92 MILLION/UL (ref 4.2–5.8)
SODIUM SERPL-SCNC: 141 MMOL/L (ref 135–146)
TRIGL SERPL-MCNC: 271 MG/DL
WBC # BLD AUTO: 6.4 10*3/UL (ref 3.8–10.8)

## 2018-11-12 ENCOUNTER — HOSPITAL ENCOUNTER (OUTPATIENT)
Dept: OTHER | Facility: HOSPITAL | Age: 83
Discharge: HOME | End: 2018-11-12
Attending: FAMILY MEDICINE | Admitting: FAMILY MEDICINE

## 2019-02-14 ENCOUNTER — CONVERSION ENCOUNTER (OUTPATIENT)
Dept: FAMILY MEDICINE CLINIC | Facility: CLINIC | Age: 84
End: 2019-02-14

## 2019-02-15 LAB
ALBUMIN SERPL-MCNC: 4.1 G/DL (ref 3.6–5.1)
ALP SERPL-CCNC: 87 U/L (ref 40–115)
ALT SERPL-CCNC: 8 U/L (ref 9–46)
AST SERPL-CCNC: 15 U/L (ref 10–35)
BASOPHILS # BLD AUTO: 30 CELLS/UL (ref 0–200)
BASOPHILS NFR BLD AUTO: 0.5 %
BILIRUB SERPL-MCNC: 0.5 MG/DL (ref 0.2–1.2)
BUN SERPL-MCNC: 22 MG/DL (ref 7–25)
BUN/CREAT SERPL: 13 (CALC) (ref 6–22)
CALCIUM SERPL-MCNC: 9.6 MG/DL (ref 8.6–10.3)
CHLORIDE SERPL-SCNC: 101 MMOL/L (ref 98–110)
CHOLEST SERPL-MCNC: 168 MG/DL
CHOLEST/HDLC SERPL: 4.4 (CALC)
CK SERPL-CCNC: 102 U/L (ref 44–196)
CONV CO2: 30 MMOL/L (ref 20–32)
CONV TOTAL PROTEIN: 7.2 G/DL (ref 6.1–8.1)
CREAT UR-MCNC: 1.68 MG/DL (ref 0.7–1.11)
EOSINOPHIL # BLD AUTO: 144 CELLS/UL (ref 15–500)
EOSINOPHIL # BLD AUTO: 2.4 %
ERYTHROCYTE [DISTWIDTH] IN BLOOD BY AUTOMATED COUNT: 13 % (ref 11–15)
GLOBULIN UR ELPH-MCNC: 3.1 MG/DL (ref 1.9–3.7)
GLUCOSE UR QL: 88 MG/DL (ref 65–99)
HCT VFR BLD AUTO: 40.1 % (ref 38.5–50)
HDLC SERPL-MCNC: 38 MG/DL
HGB BLD-MCNC: 13.4 G/DL (ref 13.2–17.1)
INSULIN SERPL-ACNC: 1.3 (CALC) (ref 1–2.5)
LDLC SERPL CALC-MCNC: 93 MG/DL
LYMPHOCYTES # BLD AUTO: 2382 CELLS/UL (ref 850–3900)
LYMPHOCYTES NFR BLD AUTO: 39.7 %
MCH RBC QN AUTO: 31.9 PG (ref 27–33)
MCHC RBC AUTO-ENTMCNC: 33.4 G/DL (ref 32–36)
MCV RBC AUTO: 95.5 FL (ref 80–100)
MONOCYTES # BLD AUTO: 462 CELLS/UL (ref 200–950)
MONOCYTES NFR BLD AUTO: 7.7 %
NEUTROPHILS # BLD AUTO: 2982 CELLS/UL (ref 1500–7800)
NEUTROPHILS NFR BLD AUTO: 49.7 %
NONHDLC SERPL-MCNC: 130 MG/DL
PLATELET # BLD AUTO: 192 10*3/UL (ref 140–400)
PMV BLD AUTO: 10.1 FL (ref 7.5–12.5)
POTASSIUM SERPL-SCNC: 3.7 MMOL/L (ref 3.5–5.3)
RBC # BLD AUTO: 4.2 MILLION/UL (ref 4.2–5.8)
SODIUM SERPL-SCNC: 142 MMOL/L (ref 135–146)
TRIGL SERPL-MCNC: 261 MG/DL
WBC # BLD AUTO: 6 10*3/UL (ref 3.8–10.8)

## 2019-05-23 ENCOUNTER — CONVERSION ENCOUNTER (OUTPATIENT)
Dept: FAMILY MEDICINE CLINIC | Facility: CLINIC | Age: 84
End: 2019-05-23

## 2019-05-23 LAB
ALBUMIN SERPL-MCNC: 3.7 G/DL (ref 3.6–5.1)
ALP SERPL-CCNC: 66 U/L (ref 40–115)
AST SERPL-CCNC: 17 U/L (ref 10–35)
BILIRUB SERPL-MCNC: 0.6 MG/DL (ref 0.2–1.2)
BUN SERPL-MCNC: 27 MG/DL (ref 7–25)
BUN/CREAT SERPL: 16.9 (CALC) (ref 6–22)
CALCIUM SERPL-MCNC: 9.1 MG/DL (ref 8.6–10.2)
CHLORIDE SERPL-SCNC: 105 MMOL/L (ref 98–110)
CHOLEST SERPL-MCNC: 158 MG/DL (ref 125–200)
CONV CO2: 24 MMOL/L (ref 21–33)
CONV TOTAL PROTEIN: 6.4 G/DL (ref 6.2–8.3)
CREAT UR-MCNC: 1.6 MG/DL (ref 0.67–1.54)
EOSINOPHIL # BLD AUTO: 100 CELLS/UL (ref 15–500)
ERYTHROCYTE [DISTWIDTH] IN BLOOD BY AUTOMATED COUNT: 14.4 % (ref 11–15)
GLOBULIN UR ELPH-MCNC: 2.7 MG/DL (ref 2.1–3.7)
GLUCOSE UR QL: 77 MG/DL (ref 65–99)
HCT VFR BLD AUTO: 36.9 % (ref 38.5–50)
HDLC SERPL-MCNC: 32 MG/DL
HGB BLD-MCNC: 12.3 G/DL (ref 13.2–17.1)
INSULIN SERPL-ACNC: 1.4 (CALC) (ref 1–2.1)
LDLC SERPL CALC-MCNC: 79 MG/DL
LYMPHOCYTES # BLD AUTO: 1900 CELLS/UL (ref 850–3900)
LYMPHOCYTES NFR BLD AUTO: 34 %
MCH RBC QN AUTO: 32.8 PG (ref 27–33)
MCHC RBC AUTO-ENTMCNC: 33.4 G/DL (ref 32–36)
MCV RBC AUTO: 98.3 FL (ref 80–100)
MONOCYTES # BLD AUTO: 500 CELLS/UL (ref 200–950)
MONOCYTES NFR BLD AUTO: 10 %
NEUTROPHILS # BLD AUTO: 3000 CELLS/UL (ref 1500–7800)
NEUTROPHILS NFR BLD AUTO: 54 %
PLATELET # BLD AUTO: 176 10*3/UL (ref 140–400)
PMV BLD AUTO: 8.1 FL (ref 7.5–11.5)
POTASSIUM SERPL-SCNC: 3.7 MMOL/L (ref 3.5–5.3)
RBC # BLD AUTO: 3.75 MILLION/UL (ref 4.2–5.8)
SODIUM SERPL-SCNC: 144 MMOL/L (ref 135–146)
TRIGL SERPL-MCNC: 234 MG/DL
WBC # BLD AUTO: 5.5 10*3/UL (ref 3.8–10.8)

## 2019-07-02 ENCOUNTER — OFFICE VISIT (OUTPATIENT)
Dept: FAMILY MEDICINE CLINIC | Facility: CLINIC | Age: 84
End: 2019-07-02

## 2019-07-02 VITALS
HEIGHT: 68 IN | OXYGEN SATURATION: 98 % | HEART RATE: 70 BPM | TEMPERATURE: 96.2 F | DIASTOLIC BLOOD PRESSURE: 70 MMHG | SYSTOLIC BLOOD PRESSURE: 111 MMHG | RESPIRATION RATE: 18 BRPM | BODY MASS INDEX: 25.85 KG/M2 | WEIGHT: 170.6 LBS

## 2019-07-02 DIAGNOSIS — M25.562 ACUTE PAIN OF BOTH KNEES: Primary | ICD-10-CM

## 2019-07-02 DIAGNOSIS — S02.2XXA CLOSED FRACTURE OF NASAL BONE, INITIAL ENCOUNTER: ICD-10-CM

## 2019-07-02 DIAGNOSIS — I10 ESSENTIAL HYPERTENSION: ICD-10-CM
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
## 2019-07-02 DIAGNOSIS — M25.561 ACUTE PAIN OF BOTH KNEES: Primary | ICD-10-CM

## 2019-07-02 PROCEDURE — 99214 OFFICE O/P EST MOD 30 MIN: CPT | Performed by: FAMILY MEDICINE

## 2019-07-02 NOTE — PROGRESS NOTES
Subjective   Diego Galan is a 89 y.o. male.   Chief Complaint   Patient presents with   • Fall     Fall   The accident occurred 12 to 24 hours ago. The fall occurred from a bed. He fell from a height of 3 to 5 ft. He landed on carpet. Blood loss: uncertain. The point of impact was the face. The pain is present in the nose. The pain is at a severity of 2/10. The pain is mild. Pertinent negatives include no headaches or loss of consciousness.   Facial Injury    The incident occurred 12 to 24 hours ago. The injury mechanism was a fall. There was no loss of consciousness. The pain is at a severity of 2/10. The pain is mild. The pain has been intermittent since the injury. Pertinent negatives include no blurred vision or headaches.   Hypertension   This is a chronic problem. The current episode started more than 1 year ago. The problem has been rapidly improving since onset. The problem is controlled (too tight). Pertinent negatives include no blurred vision, headaches or shortness of breath. Prescription medication: current ACE, Betablocker. Current antihypertension treatment includes ACE inhibitors and beta blockers. The current treatment provides significant (Too tight of control) improvement. There are no compliance problems.  There is no history of angina, CAD/MI or CVA.        The following portions of the patient's history were reviewed and updated as appropriate: allergies, current medications, past family history, past medical history, past social history, past surgical history and problem list.    Review of Systems   Constitutional: Negative for appetite change, chills and fatigue.   HENT: Positive for facial swelling and nosebleeds.         Swollen nose and nose bleed after fall   Eyes: Negative for blurred vision.   Respiratory: Negative for shortness of breath.    Neurological: Negative for loss of consciousness.       Objective   Visit Vitals  /70 (BP Location: Left arm, Patient Position:  "Sitting, Cuff Size: Adult)   Pulse 70   Temp 96.2 °F (35.7 °C) (Oral)   Resp 18   Ht 172.7 cm (68\")   Wt 77.4 kg (170 lb 9.6 oz)   SpO2 98%   BMI 25.94 kg/m²     Physical Exam   Constitutional: He is oriented to person, place, and time. He appears well-developed and well-nourished. He is cooperative.   HENT:   Head: Normocephalic.   Nose: Mucosal edema, sinus tenderness, septal deviation and nasal septal hematoma present. No epistaxis.   Deviates to the right   Neck: Trachea normal. Neck supple. Carotid bruit is not present. No thyromegaly present.   Cardiovascular: Normal rate and regular rhythm. Exam reveals no gallop and no friction rub.   No murmur heard.  Musculoskeletal:        Right knee: Tenderness found.        Left knee: Tenderness found.   Neurological: He is alert and oriented to person, place, and time.   Skin: Skin is dry. No rash noted. Nails show no clubbing.       Assessment/Plan      Problem List Items Addressed This Visit        Cardiovascular and Mediastinum    Hypertension    Current Assessment & Plan     Too tight of control.  Pt. advised to D/C Metoprolol.           Other Visit Diagnoses     Acute pain of both knees    -  Primary    New dx.  Pt. advised to try using ice.    Fall, initial encounter        New dx.  Pt. fell from bed trying to get up.      Closed fracture of nasal bone, initial encounter        New dx.  Pt. fell from bed.  Discussed xray and referral to surgeon, pt. declines.              "

## 2019-09-04 DIAGNOSIS — E78.2 MIXED HYPERLIPIDEMIA: Primary | ICD-10-CM

## 2019-09-06 LAB — HBA1C MFR BLD: 5.6 % (ref 4.8–5.6)

## 2019-09-09 RX ORDER — DIVALPROEX SODIUM 125 MG/1
TABLET, DELAYED RELEASE ORAL
Qty: 90 TABLET | Refills: 1 | Status: SHIPPED | OUTPATIENT
Start: 2019-09-09 | End: 2020-02-13 | Stop reason: SDUPTHER

## 2019-09-09 RX ORDER — METOPROLOL SUCCINATE 25 MG/1
TABLET, EXTENDED RELEASE ORAL
Qty: 90 TABLET | Refills: 3 | Status: SHIPPED | OUTPATIENT
Start: 2019-09-09 | End: 2020-02-13 | Stop reason: SDUPTHER

## 2019-09-10 ENCOUNTER — RESULTS ENCOUNTER (OUTPATIENT)
Dept: FAMILY MEDICINE CLINIC | Facility: CLINIC | Age: 84
End: 2019-09-10

## 2019-09-10 DIAGNOSIS — E13.9 OTHER SPECIFIED DIABETES MELLITUS WITHOUT COMPLICATION, WITHOUT LONG-TERM CURRENT USE OF INSULIN (HCC): ICD-10-CM

## 2019-09-12 ENCOUNTER — OFFICE VISIT (OUTPATIENT)
Dept: FAMILY MEDICINE CLINIC | Facility: CLINIC | Age: 84
End: 2019-09-12

## 2019-09-12 VITALS
TEMPERATURE: 98.2 F | BODY MASS INDEX: 25.7 KG/M2 | HEART RATE: 78 BPM | SYSTOLIC BLOOD PRESSURE: 150 MMHG | RESPIRATION RATE: 15 BRPM | DIASTOLIC BLOOD PRESSURE: 73 MMHG | OXYGEN SATURATION: 97 % | HEIGHT: 68 IN | WEIGHT: 169.6 LBS

## 2019-09-12 DIAGNOSIS — I15.9 SECONDARY HYPERTENSION: Primary | ICD-10-CM

## 2019-09-12 DIAGNOSIS — E11.9 CONTROLLED TYPE 2 DIABETES MELLITUS WITHOUT COMPLICATION, WITHOUT LONG-TERM CURRENT USE OF INSULIN (HCC): ICD-10-CM

## 2019-09-12 DIAGNOSIS — D33.3 ACOUSTIC NEUROMA (HCC): ICD-10-CM

## 2019-09-12 DIAGNOSIS — I25.709 ATHEROSCLEROSIS OF CORONARY ARTERY BYPASS GRAFT OF NATIVE HEART WITH ANGINA PECTORIS (HCC): ICD-10-CM

## 2019-09-12 PROCEDURE — G0008 ADMIN INFLUENZA VIRUS VAC: HCPCS | Performed by: FAMILY MEDICINE

## 2019-09-12 PROCEDURE — 90653 IIV ADJUVANT VACCINE IM: CPT | Performed by: FAMILY MEDICINE

## 2019-09-12 PROCEDURE — 99214 OFFICE O/P EST MOD 30 MIN: CPT | Performed by: FAMILY MEDICINE

## 2019-09-12 NOTE — ASSESSMENT & PLAN NOTE
Improved; Hgb a1c decreased from 5.7 to 5.6.  Encouraged to watch sugar intake, exercise more and lose weight.   Compliant with medication.   Not monitoring sugar at home.   Follow up in months  Care management needs are self-addressed.   Self-management abilities addressed and patient is capable of managing his own disease.
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
## 2019-09-13 PROBLEM — I25.709 ATHEROSCLEROSIS OF CORONARY ARTERY BYPASS GRAFT OF NATIVE HEART WITH ANGINA PECTORIS (HCC): Status: ACTIVE | Noted: 2019-09-13

## 2019-09-13 PROBLEM — E78.2 MIXED HYPERLIPIDEMIA: Status: ACTIVE | Noted: 2019-09-13

## 2019-09-13 PROBLEM — D33.3 ACOUSTIC NEUROMA (HCC): Status: ACTIVE | Noted: 2019-09-13

## 2019-09-20 ENCOUNTER — TELEPHONE (OUTPATIENT)
Dept: FAMILY MEDICINE CLINIC | Facility: CLINIC | Age: 84
End: 2019-09-20

## 2019-09-23 ENCOUNTER — TELEPHONE (OUTPATIENT)
Dept: FAMILY MEDICINE CLINIC | Facility: CLINIC | Age: 84
End: 2019-09-23

## 2019-10-08 RX ORDER — QUETIAPINE FUMARATE 25 MG/1
TABLET, FILM COATED ORAL
Qty: 90 TABLET | Refills: 1 | Status: SHIPPED | OUTPATIENT
Start: 2019-10-08 | End: 2020-02-13 | Stop reason: SDUPTHER

## 2019-11-04 RX ORDER — PANTOPRAZOLE SODIUM 40 MG/1
TABLET, DELAYED RELEASE ORAL
Qty: 90 TABLET | Refills: 3 | Status: SHIPPED | OUTPATIENT
Start: 2019-11-04 | End: 2020-02-13 | Stop reason: SDUPTHER

## 2019-12-30 DIAGNOSIS — F02.80 LATE ONSET ALZHEIMER'S DISEASE WITHOUT BEHAVIORAL DISTURBANCE (HCC): ICD-10-CM

## 2019-12-30 DIAGNOSIS — I10 ESSENTIAL HYPERTENSION: Primary | ICD-10-CM

## 2019-12-30 DIAGNOSIS — G30.1 LATE ONSET ALZHEIMER'S DISEASE WITHOUT BEHAVIORAL DISTURBANCE (HCC): ICD-10-CM

## 2020-01-01 RX ORDER — HYDROCHLOROTHIAZIDE 50 MG/1
TABLET ORAL
Qty: 90 TABLET | Refills: 3 | Status: SHIPPED | OUTPATIENT
Start: 2020-01-01 | End: 2020-02-13 | Stop reason: DRUGHIGH

## 2020-01-01 RX ORDER — GALANTAMINE HYDROBROMIDE 4 MG/1
TABLET, FILM COATED ORAL
Qty: 180 TABLET | Refills: 1 | Status: SHIPPED | OUTPATIENT
Start: 2020-01-01 | End: 2020-06-24

## 2020-01-01 RX ORDER — MEMANTINE HYDROCHLORIDE 5 MG/1
TABLET ORAL
Qty: 180 TABLET | Refills: 1 | Status: SHIPPED | OUTPATIENT
Start: 2020-01-01 | End: 2020-02-13 | Stop reason: SDUPTHER

## 2020-02-06 ENCOUNTER — OFFICE VISIT (OUTPATIENT)
Dept: FAMILY MEDICINE CLINIC | Facility: CLINIC | Age: 85
End: 2020-02-06

## 2020-02-06 VITALS
HEIGHT: 65 IN | DIASTOLIC BLOOD PRESSURE: 77 MMHG | WEIGHT: 172.4 LBS | RESPIRATION RATE: 18 BRPM | HEART RATE: 73 BPM | TEMPERATURE: 96.4 F | BODY MASS INDEX: 28.72 KG/M2 | OXYGEN SATURATION: 98 % | SYSTOLIC BLOOD PRESSURE: 129 MMHG

## 2020-02-06 DIAGNOSIS — I10 ESSENTIAL HYPERTENSION: ICD-10-CM

## 2020-02-06 DIAGNOSIS — M19.90 ARTHRITIS: ICD-10-CM

## 2020-02-06 DIAGNOSIS — E78.2 MIXED HYPERLIPIDEMIA: ICD-10-CM

## 2020-02-06 DIAGNOSIS — E11.9 CONTROLLED TYPE 2 DIABETES MELLITUS WITHOUT COMPLICATION, WITHOUT LONG-TERM CURRENT USE OF INSULIN (HCC): ICD-10-CM

## 2020-02-06 DIAGNOSIS — R53.83 LETHARGY: ICD-10-CM

## 2020-02-06 DIAGNOSIS — I25.709 ATHEROSCLEROSIS OF CORONARY ARTERY BYPASS GRAFT OF NATIVE HEART WITH ANGINA PECTORIS (HCC): Primary | ICD-10-CM

## 2020-02-06 DIAGNOSIS — R35.1 NOCTURIA: ICD-10-CM

## 2020-02-06 DIAGNOSIS — D64.9 ANEMIA, UNSPECIFIED TYPE: ICD-10-CM

## 2020-02-06 PROBLEM — H83.3X3 NOISE-INDUCED HEARING LOSS OF BOTH EARS: Status: ACTIVE | Noted: 2020-02-06

## 2020-02-06 PROBLEM — R41.3 MEMORY LOSS: Status: ACTIVE | Noted: 2020-02-06

## 2020-02-06 PROBLEM — I11.9 HYPERTENSIVE LEFT VENTRICULAR HYPERTROPHY, WITHOUT HEART FAILURE: Status: ACTIVE | Noted: 2020-02-06

## 2020-02-06 PROBLEM — N18.9 CHRONIC RENAL FAILURE, UNSPECIFIED STAGE: Status: ACTIVE | Noted: 2020-02-06

## 2020-02-06 PROBLEM — I05.9 MITRAL VALVE DISEASE: Status: ACTIVE | Noted: 2020-02-06

## 2020-02-06 LAB
BILIRUB BLD-MCNC: NEGATIVE MG/DL
CLARITY, POC: ABNORMAL
COLOR UR: YELLOW
GLUCOSE UR STRIP-MCNC: NEGATIVE MG/DL
KETONES UR QL: NEGATIVE
LEUKOCYTE EST, POC: NEGATIVE
NITRITE UR-MCNC: NEGATIVE MG/ML
PH UR: 5 [PH] (ref 5–8)
POC CREATININE URINE: 0
POC MICROALBUMIN URINE: 0
PROT UR STRIP-MCNC: NEGATIVE MG/DL
RBC # UR STRIP: NEGATIVE /UL
SP GR UR: 1.01 (ref 1–1.03)
UROBILINOGEN UR QL: NORMAL

## 2020-02-06 PROCEDURE — G0439 PPPS, SUBSEQ VISIT: HCPCS | Performed by: FAMILY MEDICINE

## 2020-02-06 PROCEDURE — 93000 ELECTROCARDIOGRAM COMPLETE: CPT | Performed by: FAMILY MEDICINE

## 2020-02-06 PROCEDURE — 99214 OFFICE O/P EST MOD 30 MIN: CPT | Performed by: FAMILY MEDICINE

## 2020-02-06 PROCEDURE — 99497 ADVNCD CARE PLAN 30 MIN: CPT | Performed by: FAMILY MEDICINE

## 2020-02-06 PROCEDURE — 81002 URINALYSIS NONAUTO W/O SCOPE: CPT | Performed by: FAMILY MEDICINE

## 2020-02-06 PROCEDURE — 36415 COLL VENOUS BLD VENIPUNCTURE: CPT | Performed by: FAMILY MEDICINE

## 2020-02-06 PROCEDURE — 82044 UR ALBUMIN SEMIQUANTITATIVE: CPT | Performed by: FAMILY MEDICINE

## 2020-02-06 RX ORDER — HYDROCHLOROTHIAZIDE 25 MG/1
TABLET ORAL DAILY
COMMUNITY
Start: 2019-05-30 | End: 2020-02-13 | Stop reason: SDUPTHER

## 2020-02-06 NOTE — ASSESSMENT & PLAN NOTE
A1c ordered, will discuss results at next visit.  Microalbumin and Monofilliment Foot Exam done, both read by me and discussed with pt.  Micro - WNL, Monofilliment -Abnormal.   Mild neuropathy - watch feet closely.   Encourged to watch sugar intake, exercise more, lose weight, .

## 2020-02-06 NOTE — ASSESSMENT & PLAN NOTE
EKG done, read by me (NSR with nonspecific diffuse lateral T waves), and discussed with pt.  Stable from 11/18.  Will follow conservatively.  If CP or other sx return for more aggressive treatment

## 2020-02-06 NOTE — ASSESSMENT & PLAN NOTE
UA done, read by me (WNL) and discussed with pt.  Offered treatment, but pt declines.  Advised to limit fluids 2 hours prior to bedtime, avoid caffeine.  Will follow conservatively.

## 2020-02-06 NOTE — PROGRESS NOTES
QUICK REFERENCE INFORMATION:  The ABCs of the Annual Wellness Visit    Medicare visit type: Subsequent     HEALTH RISK ASSESSMENT    : 1921    Recent Hospitalizations:  No hospitalization(s) within the last year..    Current Medical Providers:  Patient Care Team:  Alvin Milner MD as PCP - General (Family Medicine)  Alvin Milner MD as PCP - Claims Attributed    Smoking Status:  Social History     Tobacco Use   Smoking Status Never Smoker   Smokeless Tobacco Never Used       Alcohol Consumption:  Social History     Substance and Sexual Activity   Alcohol Use No       Depression Screen:   PHQ-2/PHQ-9 Depression Screening 2020   Little interest or pleasure in doing things 0   Feeling down, depressed, or hopeless 0   Trouble falling or staying asleep, or sleeping too much 0   Feeling tired or having little energy 0   Poor appetite or overeating 0   Feeling bad about yourself - or that you are a failure or have let yourself or your family down 0   Trouble concentrating on things, such as reading the newspaper or watching television 0   Moving or speaking so slowly that other people could have noticed. Or the opposite - being so fidgety or restless that you have been moving around a lot more than usual 0   Thoughts that you would be better off dead, or of hurting yourself in some way 0   Total Score 0   If you checked off any problems, how difficult have these problems made it for you to do your work, take care of things at home, or get along with other people? Not difficult at all     We spent 5 minutes asking patient questions, counseling and documenting in the chart patients risk of depression.    Health Habits and Functional and Cognitive Screening:  Functional & Cognitive Status 2020   Do you have difficulty preparing food and eating? Yes   Do you have difficulty bathing yourself, getting dressed or grooming yourself? No   Do you have difficulty using the toilet? No   Do you have difficulty  moving around from place to place? No   Do you have trouble with steps or getting out of a bed or a chair? Yes   Current Diet Well Balanced Diet   Dental Exam Not up to date   Eye Exam Up to date   Exercise (times per week) 3 times per week   Current Exercise Activities Include Stationary Bicycling/Spin Class   Do you need help using the phone?  Yes   Are you deaf or do you have serious difficulty hearing?  Yes   Do you need help with transportation? No   Do you need help shopping? Yes   Do you need help preparing meals?  Yes   Do you need help with housework?  Yes   Do you need help with laundry? Yes   Do you need help taking your medications? Yes   Do you need help managing money? No   Do you ever drive or ride in a car without wearing a seat belt? Yes   Have you felt unusual stress, anger or loneliness in the last month? No   Who do you live with? Other   If you need help, do you have trouble finding someone available to you? No   Have you been bothered in the last four weeks by sexual problems? No   Do you have difficulty concentrating, remembering or making decisions? No       Does the patient have evidence of cognitive impairment? No    Asiprin use counseling: Does not need ASA (and currently is not on it)    Recent Lab Results:    Lab Results   Component Value Date    GLU 87 02/06/2020     Lab Results   Component Value Date    HGBA1C 5.6 02/06/2020     Lab Results   Component Value Date    CHOL 158 05/23/2019    TRIG 203 (H) 02/06/2020    HDL 36 (L) 02/06/2020    VLDL 41 (H) 02/06/2020       Age-appropriate Screening Schedule:  Refer to the list below for future screening recommendations based on patient's age, sex and/or medical conditions. Orders for these recommended tests are listed in the plan section. The patient has been provided with a written plan.    Health Maintenance   Topic Date Due   • TDAP/TD VACCINES (1 - Tdap) 11/23/1932   • PNEUMOCOCCAL VACCINE (65+ HIGH RISK) (2 of 2 - PPSV23) 10/24/2016    • ZOSTER VACCINE (2 of 3) 10/24/2016   • DIABETIC EYE EXAM  02/04/2020   • HEMOGLOBIN A1C  08/06/2020   • LIPID PANEL  02/06/2021   • URINE MICROALBUMIN  02/06/2021   • INFLUENZA VACCINE  Completed       Immunizations reviewed and the patient was encouraged to update.  The patient agrees to contact insurance company for coverage of Shingrix.       Subjective   History of Present Illness    Diego Galan is a 89 y.o. male who presents for an Annual Wellness Visit.  Medicare Wellness:  Pt here today for Subsequent Wellness    Advanced Directive:  Pt here today to discuss Advanced Directive.    Coronary Artery Disease   Presents for follow-up visit. Pertinent negatives include no chest pain or shortness of breath. The symptoms have been stable. Compliance with diet is good. Compliance with exercise is variable. Compliance with medications is good.   Diabetes   He presents for his follow-up diabetic visit. He has type 2 diabetes mellitus. No MedicAlert identification noted. His disease course has been stable. There are no hypoglycemic associated symptoms. Associated symptoms include fatigue. Pertinent negatives for diabetes include no chest pain, no polyphagia and no polyuria. There are no hypoglycemic complications. Symptoms are stable. There are no diabetic complications. Risk factors for coronary artery disease include diabetes mellitus, hypertension, male sex and sedentary lifestyle. Current diabetic treatment includes oral agent (monotherapy). He is compliant with treatment all of the time. His weight is stable. He is following a generally healthy diet. When asked about meal planning, he reported none. He has not had a previous visit with a dietitian. He rarely participates in exercise. His home blood glucose trend is fluctuating minimally. An ACE inhibitor/angiotensin II receptor blocker is not being taken. He does not see a podiatrist.Eye exam is current.   Urinary Frequency    This is a chronic (Nocturia)  problem. The current episode started more than 1 year ago. The problem has been waxing and waning. The patient is experiencing no pain. There has been no fever. He is not sexually active. There is no history of pyelonephritis. Associated symptoms include frequency and urgency. He has tried nothing for the symptoms.   Hypertension   This is a chronic problem. The current episode started more than 1 year ago. The problem has been gradually improving since onset. The problem is controlled. Pertinent negatives include no anxiety, chest pain, orthopnea, peripheral edema or shortness of breath.       The following portions of the patient's history were reviewed and updated as appropriate: allergies, current medications, past family history, past medical history, past social history, past surgical history and problem list.    Outpatient Medications Prior to Visit   Medication Sig Dispense Refill   • carboxymethylcellulose (REFRESH PLUS) 0.5 % solution 1 drop 2 (Two) Times a Day As Needed for Dry Eyes.     • divalproex (DEPAKOTE) 125 MG DR tablet TAKE 1 TABLET DAILY 90 tablet 1   • erythromycin (ROMYCIN) 5 MG/GM ophthalmic ointment Place a 1/2 inch ribbon of ointment on the sutures of face and behind the ear two times daily 3.5 g 1   • fluticasone (FLONASE) 50 MCG/ACT nasal spray 2 sprays into each nostril Every Morning.     • galantamine (RAZADYNE) 4 MG tablet TAKE 1 TABLET TWICE A  tablet 1   • hydroCHLOROthiazide (HYDRODIURIL) 25 MG tablet Take  by mouth Daily.     • hydroCHLOROthiazide (HYDRODIURIL) 50 MG tablet TAKE 1 TABLET DAILY 90 tablet 3   • HYDROcodone-acetaminophen (NORCO) 5-325 MG per tablet Take 1 tablet by mouth Every 6 (Six) Hours As Needed for Moderate Pain (4-6) (pain). 15 tablet 0   • HYDROcodone-acetaminophen (NORCO) 5-325 MG per tablet Take 1 tablet by mouth Every 6 (Six) Hours As Needed for Moderate Pain (4-6) (pain). 15 tablet 0   • Loratadine (CLARITIN PO) Take 1 tablet by mouth Every Morning.      • memantine (NAMENDA) 5 MG tablet TAKE 1 TABLET TWICE A  tablet 1   • metoprolol succinate XL (TOPROL-XL) 25 MG 24 hr tablet TAKE 1 TABLET DAILY 90 tablet 3   • montelukast (SINGULAIR) 10 MG tablet Take 10 mg by mouth Every Morning.     • pantoprazole (PROTONIX) 40 MG EC tablet TAKE 1 TABLET DAILY 90 tablet 3   • potassium chloride (K-DUR) 10 MEQ CR tablet Take 10 mEq by mouth Every Morning.     • QUEtiapine (SEROquel) 25 MG tablet TAKE 1 TABLET AT BEDTIME 90 tablet 1   • rosuvastatin (CRESTOR) 40 MG tablet Take 40 mg by mouth 2 (Two) Times a Week. Monday thursday       No facility-administered medications prior to visit.        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    Discussion with patient and family regarding advanced directives. Advanced Care Planning form discussed at length and reviewed with patient. Patient states he does want CPR. Reviewed medical interventions with patient and the differences between each: Comfort, Limited and Full. Patient opted for Limited. Discussed the use of antibiotics at the end of life. He chose to use antibiotics consistent with treatment goals. Discussed artificially administered nutrition, patient is aware that if he is alert and oriented they can change their mind at any time. However, they have elected to have no artificial nutrition. Patient has identified his daughter Mirna Wick  as his healthcare representative. Patient encouraged to have a family meeting to discuss his/her decision regarding advanced care directives and goals of care.    In regard to the POST form:The patient opted to complete POST while in the office and copy scanned into the chart.  We spent 18 minutes discussing Advanced Care Planning information and documenting in the chart.    Identification of Risk Factors:  Risk factors include: Advance Directive Discussion  Hearing Problem  Immunizations Discussed/Encouraged (specific immunizations; Shingrix ).    Review of Systems   Constitutional: Positive for fatigue.   HENT: Negative for hearing loss.    Eyes: Negative for visual disturbance.   Respiratory: Negative for shortness of breath.    Cardiovascular: Negative for chest pain and orthopnea.        CAD   Endocrine: Negative for polyphagia and polyuria.   Genitourinary: Positive for frequency, nocturia and urgency.        Nocturia   Musculoskeletal: Negative for joint swelling.        Joint pain     Skin: Negative for skin lesions.   Neurological: Negative for syncope, numbness and memory problem.       Compared to one year ago, the patient feels his physical health is the same.  Compared to one year ago, the patient feels his mental health is the same.    Objective     Physical Exam   Constitutional: He is oriented to person, place, and time. He appears well-developed and well-nourished. He is cooperative.   HENT:   Head: Normocephalic.   Neck: Trachea normal. Neck supple. Carotid bruit is not present. No thyromegaly present.   Cardiovascular: Normal rate and regular rhythm. Exam reveals no gallop and no friction rub.   No murmur heard.  Pulmonary/Chest: Effort normal and breath sounds normal. No respiratory distress. He has no wheezes. He exhibits no tenderness.      During the foot exam he had a monofilament test performed (Abormal).     Neurological: He is alert and oriented to person, place, and time.   Skin: Skin is  "dry. No rash noted. Nails show no clubbing.   Nursing note and vitals reviewed.      Vitals:    02/06/20 0910   BP: 129/77   BP Location: Right arm   Patient Position: Sitting   Cuff Size: Adult   Pulse: 73   Resp: 18   Temp: 96.4 °F (35.8 °C)   TempSrc: Oral   SpO2: 98%   Weight: 78.2 kg (172 lb 6.4 oz)   Height: 163.8 cm (64.5\")   PainSc: 0-No pain       Patient's Body mass index is 29.14 kg/m². BMI is within normal parameters. No follow-up required..      Assessment/Plan   Patient Self-Management and Personalized Health Advice  The patient has been provided with information about: diet, exercise and designing advance directives and preventive services including:   · Annual Wellness Visit (AWV)  · Depression Screening (15 minutes face to face, Code ).    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  Stable from 11/18.  Will follow conservatively.  If CP or other sx return for more aggressive treatment         Relevant Orders    ECG 12 Lead       Medium    Diabetes mellitus type II, controlled (CMS/HCC)    Current Assessment & Plan     A1c ordered, will discuss results at next visit.  Microalbumin and Monofilliment Foot Exam done, both read by me and discussed with pt.  Micro - WNL, Monofilliment -Abnormal.   Mild neuropathy - watch feet closely.   Encourged to watch sugar intake, exercise more, lose weight, .         Relevant Orders    Hemoglobin A1c (Completed)    POCT microalbumin (Completed)    Hypertension    Current Assessment & Plan     Hypertension is improving with treatment.  Continue current treatment regimen.  Dietary sodium restriction.  Regular aerobic exercise.  Blood pressure will be reassessed in 3 months.         Relevant Medications    " hydroCHLOROthiazide (HYDRODIURIL) 25 MG tablet       Unprioritized    Anemia    Current Assessment & Plan     CBC ordered, will discuss results at next visit.         Relevant Orders    CBC w AUTO Differential (Completed)    Arthritis    Current Assessment & Plan     CRP ordered, will discuss results at next visit.         Relevant Orders    C-reactive protein (Completed)    Lethargy    Current Assessment & Plan     TSH ordered, will discuss results at next visit.         Relevant Orders    TSH (Completed)    Mixed hyperlipidemia    Current Assessment & Plan     CMP, Lipid ordered, will discuss results at next visit.         Relevant Orders    Comprehensive metabolic panel (Completed)    Lipid Panel w/ Chol/HDL Ratio (Completed)    Nocturia    Current Assessment & Plan     UA done, read by me (WNL) and discussed with pt.  Offered treatment, but pt declines.  Advised to limit fluids 2 hours prior to bedtime, avoid caffeine.  Will follow conservatively.         Relevant Orders    POCT urinalysis dipstick, manual (Completed)          Outpatient Encounter Medications as of 2/6/2020   Medication Sig Dispense Refill   • carboxymethylcellulose (REFRESH PLUS) 0.5 % solution 1 drop 2 (Two) Times a Day As Needed for Dry Eyes.     • divalproex (DEPAKOTE) 125 MG DR tablet TAKE 1 TABLET DAILY 90 tablet 1   • erythromycin (ROMYCIN) 5 MG/GM ophthalmic ointment Place a 1/2 inch ribbon of ointment on the sutures of face and behind the ear two times daily 3.5 g 1   • fluticasone (FLONASE) 50 MCG/ACT nasal spray 2 sprays into each nostril Every Morning.     • galantamine (RAZADYNE) 4 MG tablet TAKE 1 TABLET TWICE A  tablet 1   • hydroCHLOROthiazide (HYDRODIURIL) 25 MG tablet Take  by mouth Daily.     • hydroCHLOROthiazide (HYDRODIURIL) 50 MG tablet TAKE 1 TABLET DAILY 90 tablet 3   • HYDROcodone-acetaminophen (NORCO) 5-325 MG per tablet Take 1 tablet by mouth Every 6 (Six) Hours As Needed for Moderate Pain (4-6) (pain). 15  tablet 0   • HYDROcodone-acetaminophen (NORCO) 5-325 MG per tablet Take 1 tablet by mouth Every 6 (Six) Hours As Needed for Moderate Pain (4-6) (pain). 15 tablet 0   • Loratadine (CLARITIN PO) Take 1 tablet by mouth Every Morning.     • memantine (NAMENDA) 5 MG tablet TAKE 1 TABLET TWICE A  tablet 1   • metoprolol succinate XL (TOPROL-XL) 25 MG 24 hr tablet TAKE 1 TABLET DAILY 90 tablet 3   • montelukast (SINGULAIR) 10 MG tablet Take 10 mg by mouth Every Morning.     • pantoprazole (PROTONIX) 40 MG EC tablet TAKE 1 TABLET DAILY 90 tablet 3   • potassium chloride (K-DUR) 10 MEQ CR tablet Take 10 mEq by mouth Every Morning.     • QUEtiapine (SEROquel) 25 MG tablet TAKE 1 TABLET AT BEDTIME 90 tablet 1   • rosuvastatin (CRESTOR) 40 MG tablet Take 40 mg by mouth 2 (Two) Times a Week. Monday thursday       No facility-administered encounter medications on file as of 2/6/2020.        Reviewed use of high risk medication in the elderly: yes  Reviewed for potential of harmful drug interactions in the elderly: yes    Follow Up:  No follow-ups on file.     An After Visit Summary and PPPS with all of these plans were given to the patient.

## 2020-02-07 LAB
ALBUMIN SERPL-MCNC: 4.1 G/DL (ref 3.5–4.6)
ALBUMIN/GLOB SERPL: 1.6 {RATIO} (ref 1.2–2.2)
ALP SERPL-CCNC: 78 IU/L (ref 39–117)
ALT SERPL-CCNC: 7 IU/L (ref 0–44)
AST SERPL-CCNC: 16 IU/L (ref 0–40)
BASOPHILS # BLD AUTO: 0 X10E3/UL (ref 0–0.2)
BASOPHILS NFR BLD AUTO: 1 %
BILIRUB SERPL-MCNC: 0.3 MG/DL (ref 0–1.2)
BUN SERPL-MCNC: 28 MG/DL (ref 10–36)
BUN/CREAT SERPL: 16 (ref 10–24)
CALCIUM SERPL-MCNC: 9.3 MG/DL (ref 8.6–10.2)
CHLORIDE SERPL-SCNC: 103 MMOL/L (ref 96–106)
CHOLEST SERPL-MCNC: 180 MG/DL (ref 100–199)
CHOLEST/HDLC SERPL: 5 RATIO (ref 0–5)
CO2 SERPL-SCNC: 26 MMOL/L (ref 20–29)
CREAT SERPL-MCNC: 1.79 MG/DL (ref 0.76–1.27)
CRP SERPL-MCNC: 6 MG/L (ref 0–10)
EOSINOPHIL # BLD AUTO: 0.1 X10E3/UL (ref 0–0.4)
EOSINOPHIL NFR BLD AUTO: 2 %
ERYTHROCYTE [DISTWIDTH] IN BLOOD BY AUTOMATED COUNT: 13.1 % (ref 11.6–15.4)
GLOBULIN SER CALC-MCNC: 2.6 G/DL (ref 1.5–4.5)
GLUCOSE SERPL-MCNC: 87 MG/DL (ref 65–99)
HBA1C MFR BLD: 5.6 % (ref 4.8–5.6)
HCT VFR BLD AUTO: 36.5 % (ref 37.5–51)
HDLC SERPL-MCNC: 36 MG/DL
HGB BLD-MCNC: 11.7 G/DL (ref 13–17.7)
IMM GRANULOCYTES # BLD AUTO: 0 X10E3/UL (ref 0–0.1)
IMM GRANULOCYTES NFR BLD AUTO: 0 %
LDLC SERPL CALC-MCNC: 103 MG/DL (ref 0–99)
LYMPHOCYTES # BLD AUTO: 1.8 X10E3/UL (ref 0.7–3.1)
LYMPHOCYTES NFR BLD AUTO: 29 %
MCH RBC QN AUTO: 31.8 PG (ref 26.6–33)
MCHC RBC AUTO-ENTMCNC: 32.1 G/DL (ref 31.5–35.7)
MCV RBC AUTO: 99 FL (ref 79–97)
MONOCYTES # BLD AUTO: 0.6 X10E3/UL (ref 0.1–0.9)
MONOCYTES NFR BLD AUTO: 9 %
NEUTROPHILS # BLD AUTO: 3.6 X10E3/UL (ref 1.4–7)
NEUTROPHILS NFR BLD AUTO: 59 %
PLATELET # BLD AUTO: 172 X10E3/UL (ref 150–450)
POTASSIUM SERPL-SCNC: 3.9 MMOL/L (ref 3.5–5.2)
PROT SERPL-MCNC: 6.7 G/DL (ref 6–8.5)
RBC # BLD AUTO: 3.68 X10E6/UL (ref 4.14–5.8)
SODIUM SERPL-SCNC: 145 MMOL/L (ref 134–144)
TRIGL SERPL-MCNC: 203 MG/DL (ref 0–149)
TSH SERPL DL<=0.005 MIU/L-ACNC: 2.47 UIU/ML (ref 0.45–4.5)
VLDLC SERPL CALC-MCNC: 41 MG/DL (ref 5–40)
WBC # BLD AUTO: 6.2 X10E3/UL (ref 3.4–10.8)

## 2020-02-12 PROBLEM — K21.9 GERD (GASTROESOPHAGEAL REFLUX DISEASE): Status: ACTIVE | Noted: 2020-02-12

## 2020-02-12 PROBLEM — N40.0 BPH (BENIGN PROSTATIC HYPERPLASIA): Status: ACTIVE | Noted: 2020-02-12

## 2020-02-13 ENCOUNTER — OFFICE VISIT (OUTPATIENT)
Dept: FAMILY MEDICINE CLINIC | Facility: CLINIC | Age: 85
End: 2020-02-13

## 2020-02-13 VITALS
HEART RATE: 67 BPM | SYSTOLIC BLOOD PRESSURE: 114 MMHG | HEIGHT: 65 IN | OXYGEN SATURATION: 97 % | DIASTOLIC BLOOD PRESSURE: 66 MMHG | BODY MASS INDEX: 28.56 KG/M2 | WEIGHT: 171.4 LBS | RESPIRATION RATE: 18 BRPM

## 2020-02-13 DIAGNOSIS — K42.9 UMBILICAL HERNIA WITHOUT OBSTRUCTION AND WITHOUT GANGRENE: ICD-10-CM

## 2020-02-13 DIAGNOSIS — I25.709 ATHEROSCLEROSIS OF CORONARY ARTERY BYPASS GRAFT OF NATIVE HEART WITH ANGINA PECTORIS (HCC): ICD-10-CM

## 2020-02-13 DIAGNOSIS — I05.9 MITRAL VALVE DISEASE: ICD-10-CM

## 2020-02-13 DIAGNOSIS — N40.0 BENIGN PROSTATIC HYPERPLASIA, UNSPECIFIED WHETHER LOWER URINARY TRACT SYMPTOMS PRESENT: ICD-10-CM

## 2020-02-13 DIAGNOSIS — R53.83 LETHARGY: ICD-10-CM

## 2020-02-13 DIAGNOSIS — I11.9 HYPERTENSIVE LEFT VENTRICULAR HYPERTROPHY, WITHOUT HEART FAILURE: ICD-10-CM

## 2020-02-13 DIAGNOSIS — F02.80 LATE ONSET ALZHEIMER'S DISEASE WITHOUT BEHAVIORAL DISTURBANCE (HCC): ICD-10-CM

## 2020-02-13 DIAGNOSIS — M25.562 CHRONIC PAIN OF BOTH KNEES: ICD-10-CM

## 2020-02-13 DIAGNOSIS — R97.20 ELEVATED PSA: ICD-10-CM

## 2020-02-13 DIAGNOSIS — K21.9 GASTROESOPHAGEAL REFLUX DISEASE, ESOPHAGITIS PRESENCE NOT SPECIFIED: ICD-10-CM

## 2020-02-13 DIAGNOSIS — F03.91 DEMENTIA WITH BEHAVIORAL DISTURBANCE, UNSPECIFIED DEMENTIA TYPE: Primary | ICD-10-CM

## 2020-02-13 DIAGNOSIS — Z00.00 ANNUAL PHYSICAL EXAM: ICD-10-CM

## 2020-02-13 DIAGNOSIS — N18.9 CHRONIC RENAL FAILURE, UNSPECIFIED STAGE: ICD-10-CM

## 2020-02-13 DIAGNOSIS — K41.90 FEMORAL HERNIA OF LEFT SIDE: ICD-10-CM

## 2020-02-13 DIAGNOSIS — E78.2 MIXED HYPERLIPIDEMIA: ICD-10-CM

## 2020-02-13 DIAGNOSIS — R26.81 GAIT INSTABILITY: ICD-10-CM

## 2020-02-13 DIAGNOSIS — E11.9 CONTROLLED TYPE 2 DIABETES MELLITUS WITHOUT COMPLICATION, WITHOUT LONG-TERM CURRENT USE OF INSULIN (HCC): ICD-10-CM

## 2020-02-13 DIAGNOSIS — G30.1 LATE ONSET ALZHEIMER'S DISEASE WITHOUT BEHAVIORAL DISTURBANCE (HCC): ICD-10-CM

## 2020-02-13 DIAGNOSIS — L03.312 CELLULITIS OF BACK EXCEPT BUTTOCK: ICD-10-CM

## 2020-02-13 DIAGNOSIS — D33.3 ACOUSTIC NEUROMA (HCC): ICD-10-CM

## 2020-02-13 DIAGNOSIS — I10 ESSENTIAL HYPERTENSION: ICD-10-CM

## 2020-02-13 DIAGNOSIS — M19.90 ARTHRITIS: ICD-10-CM

## 2020-02-13 DIAGNOSIS — R41.3 MEMORY LOSS: ICD-10-CM

## 2020-02-13 DIAGNOSIS — J30.89 CHRONIC NONSEASONAL ALLERGIC RHINITIS DUE TO POLLEN: ICD-10-CM

## 2020-02-13 DIAGNOSIS — Z87.442 HISTORY OF KIDNEY STONES: ICD-10-CM

## 2020-02-13 DIAGNOSIS — G89.29 CHRONIC PAIN OF BOTH KNEES: ICD-10-CM

## 2020-02-13 DIAGNOSIS — H83.3X3 NOISE-INDUCED HEARING LOSS OF BOTH EARS: ICD-10-CM

## 2020-02-13 DIAGNOSIS — M25.561 CHRONIC PAIN OF BOTH KNEES: ICD-10-CM

## 2020-02-13 DIAGNOSIS — R35.1 NOCTURIA: ICD-10-CM

## 2020-02-13 DIAGNOSIS — E87.0 HYPERNATREMIA: ICD-10-CM

## 2020-02-13 DIAGNOSIS — D64.9 ANEMIA, UNSPECIFIED TYPE: ICD-10-CM

## 2020-02-13 DIAGNOSIS — I35.0 NONRHEUMATIC AORTIC VALVE STENOSIS: ICD-10-CM

## 2020-02-13 PROBLEM — M25.569 KNEE PAIN: Status: ACTIVE | Noted: 2020-02-13

## 2020-02-13 PROBLEM — F03.90 DEMENTIA (HCC): Status: ACTIVE | Noted: 2020-02-13

## 2020-02-13 PROBLEM — L03.319 CELLULITIS OF TRUNK: Status: ACTIVE | Noted: 2020-02-13

## 2020-02-13 PROCEDURE — 99214 OFFICE O/P EST MOD 30 MIN: CPT | Performed by: FAMILY MEDICINE

## 2020-02-13 PROCEDURE — 99397 PER PM REEVAL EST PAT 65+ YR: CPT | Performed by: FAMILY MEDICINE

## 2020-02-13 RX ORDER — DIVALPROEX SODIUM 125 MG/1
125 TABLET, DELAYED RELEASE ORAL DAILY
Qty: 90 TABLET | Refills: 3 | Status: SHIPPED | OUTPATIENT
Start: 2020-02-13 | End: 2020-03-11

## 2020-02-13 RX ORDER — ROSUVASTATIN CALCIUM 40 MG/1
40 TABLET, COATED ORAL 2 TIMES WEEKLY
Qty: 90 TABLET | Refills: 3 | Status: SHIPPED | OUTPATIENT
Start: 2020-02-13 | End: 2020-09-23 | Stop reason: SDUPTHER

## 2020-02-13 RX ORDER — PANTOPRAZOLE SODIUM 40 MG/1
40 TABLET, DELAYED RELEASE ORAL DAILY
Qty: 90 TABLET | Refills: 3 | Status: SHIPPED | OUTPATIENT
Start: 2020-02-13 | End: 2020-12-21 | Stop reason: SDUPTHER

## 2020-02-13 RX ORDER — ASPIRIN 81 MG/1
TABLET, CHEWABLE ORAL
COMMUNITY
Start: 2019-05-30 | End: 2021-04-13

## 2020-02-13 RX ORDER — CEPHALEXIN 500 MG/1
500 CAPSULE ORAL 2 TIMES DAILY
Qty: 20 CAPSULE | Refills: 0 | Status: SHIPPED | OUTPATIENT
Start: 2020-02-13 | End: 2020-06-30

## 2020-02-13 RX ORDER — POTASSIUM CHLORIDE 750 MG/1
10 TABLET, FILM COATED, EXTENDED RELEASE ORAL EVERY MORNING
Qty: 90 TABLET | Refills: 3 | Status: SHIPPED | OUTPATIENT
Start: 2020-02-13 | End: 2020-09-23 | Stop reason: SDUPTHER

## 2020-02-13 RX ORDER — MONTELUKAST SODIUM 10 MG/1
10 TABLET ORAL EVERY MORNING
Qty: 90 TABLET | Refills: 3 | Status: SHIPPED | OUTPATIENT
Start: 2020-02-13 | End: 2020-06-18

## 2020-02-13 RX ORDER — MEMANTINE HYDROCHLORIDE 5 MG/1
5 TABLET ORAL 2 TIMES DAILY
Qty: 180 TABLET | Refills: 3 | Status: SHIPPED | OUTPATIENT
Start: 2020-02-13 | End: 2020-06-24

## 2020-02-13 RX ORDER — METOPROLOL SUCCINATE 25 MG/1
25 TABLET, EXTENDED RELEASE ORAL DAILY
Qty: 90 TABLET | Refills: 3 | Status: SHIPPED | OUTPATIENT
Start: 2020-02-13 | End: 2020-05-19 | Stop reason: DRUGHIGH

## 2020-02-13 RX ORDER — HYDROCHLOROTHIAZIDE 25 MG/1
12.5 TABLET ORAL DAILY
Qty: 30 TABLET | Refills: 0
Start: 2020-02-13 | End: 2020-08-05 | Stop reason: SDUPTHER

## 2020-02-13 RX ORDER — QUETIAPINE FUMARATE 25 MG/1
25 TABLET, FILM COATED ORAL
Qty: 90 TABLET | Refills: 3 | Status: SHIPPED | OUTPATIENT
Start: 2020-02-13 | End: 2020-03-30

## 2020-02-14 LAB
BASOPHILS # BLD AUTO: 0 X10E3/UL (ref 0–0.2)
BASOPHILS NFR BLD AUTO: 0 %
EOSINOPHIL # BLD AUTO: 0.1 X10E3/UL (ref 0–0.4)
EOSINOPHIL NFR BLD AUTO: 2 %
ERYTHROCYTE [DISTWIDTH] IN BLOOD BY AUTOMATED COUNT: 13 % (ref 11.6–15.4)
FERRITIN SERPL-MCNC: 202 NG/ML (ref 30–400)
FOLATE SERPL-MCNC: 11 NG/ML
HCT VFR BLD AUTO: 36.8 % (ref 37.5–51)
HGB BLD-MCNC: 11.9 G/DL (ref 13–17.7)
IMM GRANULOCYTES # BLD AUTO: 0 X10E3/UL (ref 0–0.1)
IMM GRANULOCYTES NFR BLD AUTO: 0 %
IRON SATN MFR SERPL: 23 % (ref 15–55)
IRON SERPL-MCNC: 64 UG/DL (ref 38–169)
LYMPHOCYTES # BLD AUTO: 1.6 X10E3/UL (ref 0.7–3.1)
LYMPHOCYTES NFR BLD AUTO: 27 %
MCH RBC QN AUTO: 32 PG (ref 26.6–33)
MCHC RBC AUTO-ENTMCNC: 32.3 G/DL (ref 31.5–35.7)
MCV RBC AUTO: 99 FL (ref 79–97)
MONOCYTES # BLD AUTO: 0.5 X10E3/UL (ref 0.1–0.9)
MONOCYTES NFR BLD AUTO: 8 %
NEUTROPHILS # BLD AUTO: 3.6 X10E3/UL (ref 1.4–7)
NEUTROPHILS NFR BLD AUTO: 63 %
PLATELET # BLD AUTO: 198 X10E3/UL (ref 150–450)
RBC # BLD AUTO: 3.72 X10E6/UL (ref 4.14–5.8)
RETICS/RBC NFR AUTO: 1.3 % (ref 0.6–2.6)
TIBC SERPL-MCNC: 278 UG/DL (ref 250–450)
UIBC SERPL-MCNC: 214 UG/DL (ref 111–343)
VIT B12 SERPL-MCNC: 329 PG/ML (ref 232–1245)
WBC # BLD AUTO: 5.7 X10E3/UL (ref 3.4–10.8)

## 2020-02-26 ENCOUNTER — OFFICE VISIT (OUTPATIENT)
Dept: FAMILY MEDICINE CLINIC | Facility: CLINIC | Age: 85
End: 2020-02-26

## 2020-02-26 VITALS
RESPIRATION RATE: 18 BRPM | HEIGHT: 65 IN | TEMPERATURE: 97.9 F | BODY MASS INDEX: 28.96 KG/M2 | OXYGEN SATURATION: 97 % | WEIGHT: 173.8 LBS | SYSTOLIC BLOOD PRESSURE: 132 MMHG | DIASTOLIC BLOOD PRESSURE: 74 MMHG | HEART RATE: 88 BPM

## 2020-02-26 DIAGNOSIS — I10 ESSENTIAL HYPERTENSION: ICD-10-CM

## 2020-02-26 DIAGNOSIS — D64.9 ANEMIA, UNSPECIFIED TYPE: Primary | ICD-10-CM

## 2020-02-26 PROCEDURE — 99213 OFFICE O/P EST LOW 20 MIN: CPT | Performed by: FAMILY MEDICINE

## 2020-03-01 PROBLEM — E87.0 HYPERNATREMIA: Status: ACTIVE | Noted: 2020-03-01

## 2020-03-10 DIAGNOSIS — G30.1 LATE ONSET ALZHEIMER'S DISEASE WITHOUT BEHAVIORAL DISTURBANCE (HCC): ICD-10-CM

## 2020-03-10 DIAGNOSIS — F02.80 LATE ONSET ALZHEIMER'S DISEASE WITHOUT BEHAVIORAL DISTURBANCE (HCC): ICD-10-CM

## 2020-03-11 RX ORDER — DIVALPROEX SODIUM 125 MG/1
TABLET, DELAYED RELEASE ORAL
Qty: 90 TABLET | Refills: 1 | Status: SHIPPED | OUTPATIENT
Start: 2020-03-11 | End: 2020-09-23

## 2020-03-30 DIAGNOSIS — G30.1 LATE ONSET ALZHEIMER'S DISEASE WITHOUT BEHAVIORAL DISTURBANCE (HCC): ICD-10-CM

## 2020-03-30 DIAGNOSIS — F02.80 LATE ONSET ALZHEIMER'S DISEASE WITHOUT BEHAVIORAL DISTURBANCE (HCC): ICD-10-CM

## 2020-03-30 RX ORDER — QUETIAPINE FUMARATE 25 MG/1
TABLET, FILM COATED ORAL
Qty: 90 TABLET | Refills: 1 | Status: SHIPPED | OUTPATIENT
Start: 2020-03-30 | End: 2020-09-28

## 2020-04-06 ENCOUNTER — TELEPHONE (OUTPATIENT)
Dept: FAMILY MEDICINE CLINIC | Facility: CLINIC | Age: 85
End: 2020-04-06

## 2020-04-06 NOTE — TELEPHONE ENCOUNTER
Called and spoke with daughter, she was confused on the dosage of Crestor that he should be taking.  Advised her that he is taking 40mg.

## 2020-05-13 ENCOUNTER — RESULTS ENCOUNTER (OUTPATIENT)
Dept: FAMILY MEDICINE CLINIC | Facility: CLINIC | Age: 85
End: 2020-05-13

## 2020-05-13 DIAGNOSIS — E11.9 CONTROLLED TYPE 2 DIABETES MELLITUS WITHOUT COMPLICATION, WITHOUT LONG-TERM CURRENT USE OF INSULIN (HCC): ICD-10-CM

## 2020-05-13 DIAGNOSIS — E78.2 MIXED HYPERLIPIDEMIA: ICD-10-CM

## 2020-05-13 DIAGNOSIS — D64.9 ANEMIA, UNSPECIFIED TYPE: ICD-10-CM

## 2020-05-16 LAB
ALBUMIN SERPL-MCNC: 4.4 G/DL (ref 3.5–4.6)
ALBUMIN/GLOB SERPL: 1.8 {RATIO} (ref 1.2–2.2)
ALP SERPL-CCNC: 85 IU/L (ref 39–117)
ALT SERPL-CCNC: 9 IU/L (ref 0–44)
AST SERPL-CCNC: 17 IU/L (ref 0–40)
BASOPHILS # BLD AUTO: 0 X10E3/UL (ref 0–0.2)
BASOPHILS NFR BLD AUTO: 1 %
BILIRUB SERPL-MCNC: 0.4 MG/DL (ref 0–1.2)
BUN SERPL-MCNC: 30 MG/DL (ref 10–36)
BUN/CREAT SERPL: 17 (ref 10–24)
CALCIUM SERPL-MCNC: 9.2 MG/DL (ref 8.6–10.2)
CHLORIDE SERPL-SCNC: 103 MMOL/L (ref 96–106)
CHOLEST SERPL-MCNC: 177 MG/DL (ref 100–199)
CHOLEST/HDLC SERPL: 4.9 RATIO (ref 0–5)
CO2 SERPL-SCNC: 28 MMOL/L (ref 20–29)
CREAT SERPL-MCNC: 1.74 MG/DL (ref 0.76–1.27)
EOSINOPHIL # BLD AUTO: 0.3 X10E3/UL (ref 0–0.4)
EOSINOPHIL NFR BLD AUTO: 5 %
ERYTHROCYTE [DISTWIDTH] IN BLOOD BY AUTOMATED COUNT: 13.1 % (ref 11.6–15.4)
GLOBULIN SER CALC-MCNC: 2.5 G/DL (ref 1.5–4.5)
GLUCOSE SERPL-MCNC: 88 MG/DL (ref 65–99)
HBA1C MFR BLD: 5.7 % (ref 4.8–5.6)
HCT VFR BLD AUTO: 37.6 % (ref 37.5–51)
HDLC SERPL-MCNC: 36 MG/DL
HGB BLD-MCNC: 12.1 G/DL (ref 13–17.7)
IMM GRANULOCYTES # BLD AUTO: 0 X10E3/UL (ref 0–0.1)
IMM GRANULOCYTES NFR BLD AUTO: 0 %
LDLC SERPL CALC-MCNC: 109 MG/DL (ref 0–99)
LYMPHOCYTES # BLD AUTO: 1.9 X10E3/UL (ref 0.7–3.1)
LYMPHOCYTES NFR BLD AUTO: 35 %
MCH RBC QN AUTO: 32.1 PG (ref 26.6–33)
MCHC RBC AUTO-ENTMCNC: 32.2 G/DL (ref 31.5–35.7)
MCV RBC AUTO: 100 FL (ref 79–97)
MONOCYTES # BLD AUTO: 0.5 X10E3/UL (ref 0.1–0.9)
MONOCYTES NFR BLD AUTO: 9 %
NEUTROPHILS # BLD AUTO: 2.8 X10E3/UL (ref 1.4–7)
NEUTROPHILS NFR BLD AUTO: 50 %
PLATELET # BLD AUTO: 175 X10E3/UL (ref 150–450)
POTASSIUM SERPL-SCNC: 4.1 MMOL/L (ref 3.5–5.2)
PROT SERPL-MCNC: 6.9 G/DL (ref 6–8.5)
RBC # BLD AUTO: 3.77 X10E6/UL (ref 4.14–5.8)
SODIUM SERPL-SCNC: 144 MMOL/L (ref 134–144)
TRIGL SERPL-MCNC: 161 MG/DL (ref 0–149)
VLDLC SERPL CALC-MCNC: 32 MG/DL (ref 5–40)
WBC # BLD AUTO: 5.5 X10E3/UL (ref 3.4–10.8)

## 2020-05-19 ENCOUNTER — OFFICE VISIT (OUTPATIENT)
Dept: FAMILY MEDICINE CLINIC | Facility: CLINIC | Age: 85
End: 2020-05-19

## 2020-05-19 VITALS
SYSTOLIC BLOOD PRESSURE: 111 MMHG | BODY MASS INDEX: 25.64 KG/M2 | HEART RATE: 72 BPM | RESPIRATION RATE: 18 BRPM | TEMPERATURE: 96.4 F | HEIGHT: 68 IN | DIASTOLIC BLOOD PRESSURE: 64 MMHG | OXYGEN SATURATION: 97 % | WEIGHT: 169.2 LBS

## 2020-05-19 DIAGNOSIS — D64.9 ANEMIA, UNSPECIFIED TYPE: ICD-10-CM

## 2020-05-19 DIAGNOSIS — I10 ESSENTIAL HYPERTENSION: Primary | ICD-10-CM

## 2020-05-19 DIAGNOSIS — E11.9 CONTROLLED TYPE 2 DIABETES MELLITUS WITHOUT COMPLICATION, WITHOUT LONG-TERM CURRENT USE OF INSULIN (HCC): ICD-10-CM

## 2020-05-19 DIAGNOSIS — E87.0 HYPERNATREMIA: ICD-10-CM

## 2020-05-19 DIAGNOSIS — D48.9 NEOPLASM, UNCERTAIN WHETHER BENIGN OR MALIGNANT: ICD-10-CM

## 2020-05-19 DIAGNOSIS — N18.9 CHRONIC RENAL FAILURE, UNSPECIFIED STAGE: ICD-10-CM

## 2020-05-19 DIAGNOSIS — E78.2 MIXED HYPERLIPIDEMIA: ICD-10-CM

## 2020-05-19 PROCEDURE — 99214 OFFICE O/P EST MOD 30 MIN: CPT | Performed by: FAMILY MEDICINE

## 2020-05-19 NOTE — ASSESSMENT & PLAN NOTE
Too tight of control.  Decrease Metoprolol to 12.5 mg daily.  Encouraged to watch salt, exercise more and lose weight.

## 2020-05-19 NOTE — ASSESSMENT & PLAN NOTE
Labs done 5-, read by me, reviewed with pt.  Doing well.  Trig. 161 down from 203, Tot. Chol. 177 down from 180, HDL 36 samew as last,  up from 103.  Encouraged to watch fatty intake, exercise more, and lose weight.   Compliant with medication    Is getting adequate diet and exercise  Goals developed at last visit were met   Follow up in 4 months  Care management needs are self-addressed.  Self-management abilities addressed and patient is capable of managing his own disease.

## 2020-05-19 NOTE — ASSESSMENT & PLAN NOTE
Improved.  Labs done 5-, read by me, reviewed with pt.  Creatinine was 1.74 down from 1.79, EGFR was 32 up from 31.

## 2020-05-19 NOTE — ASSESSMENT & PLAN NOTE
New dx.  Raised irritated large lesion of the right upper back.  Advised excision, need to rule out carcinoma

## 2020-05-19 NOTE — PROGRESS NOTES
Subjective   Diego Galan is a 89 y.o. male.     Hyperlipidemia   This is a chronic problem. The current episode started more than 1 year ago. The problem is controlled. Recent lipid tests were reviewed and are normal. Exacerbating diseases include diabetes. Factors aggravating his hyperlipidemia include fatty foods. Pertinent negatives include no chest pain, myalgias or shortness of breath. Current antihyperlipidemic treatment includes statins. The current treatment provides significant improvement of lipids. There are no compliance problems.  Risk factors for coronary artery disease include diabetes mellitus, dyslipidemia and hypertension.   Hypertension   This is a chronic problem. The current episode started more than 1 year ago. The problem is unchanged. The problem is controlled. Pertinent negatives include no chest pain, palpitations or shortness of breath. There are no associated agents to hypertension. Risk factors for coronary artery disease include dyslipidemia and diabetes mellitus.   Diabetes   He presents for his follow-up diabetic visit. His disease course has been stable. There are no hypoglycemic associated symptoms. Pertinent negatives for diabetes include no chest pain, no fatigue, no polyphagia, no polyuria and no weight loss. There are no hypoglycemic complications. Risk factors for coronary artery disease include diabetes mellitus, dyslipidemia and hypertension. Current diabetic treatment includes oral agent (monotherapy). He is compliant with treatment all of the time. When asked about meal planning, he reported none. He has not had a previous visit with a dietitian. He does not see a podiatrist.Eye exam is current.   Anemia   Presents for follow-up visit. There has been no palpitations or weight loss. There are no compliance problems.  Compliance with medications is %.        The following portions of the patient's history were reviewed and updated as appropriate: allergies,  current medications, past family history, past medical history, past social history, past surgical history and problem list.    Family History   Problem Relation Age of Onset   • Heart disease Mother    • Stroke Mother    • Heart disease Father    • Diabetes Brother    • Cancer Brother    • Malig Hyperthermia Neg Hx        Social History     Tobacco Use   • Smoking status: Never Smoker   • Smokeless tobacco: Never Used   Substance Use Topics   • Alcohol use: No   • Drug use: No       Past Surgical History:   Procedure Laterality Date   • CHEST SURGERY      shrapnel   • COLONOSCOPY     • ECTROPION REPAIR Left 6/29/2017    Procedure: LEFT LOWER LID CICATRICIAL ECTROPION OF  REPAIR WITH FULL THICKNESS SKIN GRAFT, EXCISION LESION 4CM x 2 CM WITH ROTATIONAL FLAP LEFT CHEEK 15 CM x 6 CM WITH FROZEN SECTIONS;  Surgeon: Norman Long MD;  Location[place] [facility] [place] [facility] [place]  Service:    • ECTROPION REPAIR Right 7/6/2017    Procedure: RIGHT LOWER LID CICATRICIAL ECTROPION REPAIR,FULL THICKNESS SKIN GRAFT, SAAVEDRA SUTURE;  Surgeon: Norman Long MD;  Location: [facility] [place] [facility] [place];  Service:    • EYE SURGERY      rose marie cataracts  with iol   • FOOT SURGERY Left     war injury   • LEG SURGERY Right     war injury   • MOHS SURGERY      x2       Patient Active Problem List   Diagnosis   • Hypertension   • Diabetes mellitus type II, controlled (CMS/[facility])   • Acoustic neuroma (CMS/[facility])   • Anemia   • Aortic valve stenosis   • 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 (CMS/[facility])   • Mixed hyperlipidemia   • Chronic renal failure, unspecified stage   • Hypertensive left ventricular hypertrophy, 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   • Memory loss   • Mitral valve disease   • Noise-induced hearing loss of both ears   • Lethargy   • Arthritis   • BPH (benign prostatic hyperplasia)   • GERD (gastroesophageal reflux disease)   • Femoral hernia of left side   • Umbilical hernia without obstruction and without gangrene   • Gait  instability   • History of kidney stones   • Elevated PSA   • Knee pain   • Late onset Alzheimer's disease without behavioral disturbance (CMS/HCC)   • Chronic nonseasonal allergic rhinitis due to pollen   • Annual physical exam   • Hypernatremia   • Neoplasm, uncertain whether benign or malignant       Current Outpatient Medications on File Prior to Visit   Medication Sig Dispense Refill   • aspirin 81 MG chewable tablet Chew.     • carboxymethylcellulose (REFRESH PLUS) 0.5 % solution 1 drop 2 (Two) Times a Day As Needed for Dry Eyes.     • divalproex (DEPAKOTE) 125 MG DR tablet TAKE 1 TABLET DAILY 90 tablet 1   • erythromycin (ROMYCIN) 5 MG/GM ophthalmic ointment Place a 1/2 inch ribbon of ointment on the sutures of face and behind the ear two times daily 3.5 g 1   • galantamine (RAZADYNE) 4 MG tablet TAKE 1 TABLET TWICE A  tablet 1   • hydroCHLOROthiazide (HYDRODIURIL) 25 MG tablet Take 0.5 tablets by mouth Daily. 30 tablet 0   • memantine (NAMENDA) 5 MG tablet Take 1 tablet by mouth 2 (Two) Times a Day. 180 tablet 3   • montelukast (SINGULAIR) 10 MG tablet Take 1 tablet by mouth Every Morning. 90 tablet 3   • pantoprazole (PROTONIX) 40 MG EC tablet Take 1 tablet by mouth Daily. 90 tablet 3   • potassium chloride (K-DUR) 10 MEQ CR tablet Take 1 tablet by mouth Every Morning. 90 tablet 3   • QUEtiapine (SEROquel) 25 MG tablet TAKE 1 TABLET AT BEDTIME 90 tablet 1   • cephalexin (KEFLEX) 500 MG capsule Take 1 capsule by mouth 2 (Two) Times a Day. 20 capsule 0   • fluticasone (FLONASE) 50 MCG/ACT nasal spray 2 sprays into each nostril Every Morning.     • HYDROcodone-acetaminophen (NORCO) 5-325 MG per tablet Take 1 tablet by mouth Every 6 (Six) Hours As Needed for Moderate Pain (4-6) (pain). 15 tablet 0   • HYDROcodone-acetaminophen (NORCO) 5-325 MG per tablet Take 1 tablet by mouth Every 6 (Six) Hours As Needed for Moderate Pain (4-6) (pain). 15 tablet 0   • Loratadine (CLARITIN PO) Take 1 tablet by mouth  "Every Morning.     • rosuvastatin (CRESTOR) 40 MG tablet Take 1 tablet by mouth 2 (Two) Times a Week. Monday thursday 90 tablet 3     No current facility-administered medications on file prior to visit.        No Known Allergies    Review of Systems   Constitutional: Negative for fatigue, unexpected weight gain and unexpected weight loss.   HENT: Negative for nosebleeds.    Eyes: Negative for visual disturbance.   Respiratory: Negative for shortness of breath.    Cardiovascular: Negative for chest pain, palpitations and leg swelling.   Gastrointestinal: Negative for blood in stool, nausea and indigestion.   Endocrine: Negative for polyphagia and polyuria.   Genitourinary: Negative for frequency and hematuria.   Musculoskeletal: Negative for myalgias.   Skin: Negative for dry skin and skin lesions.   Neurological: Negative for syncope, numbness and headache.       Objective   Visit Vitals  /64 (BP Location: Right arm, Patient Position: Sitting, Cuff Size: Adult)   Pulse 72   Temp 96.4 °F (35.8 °C) (Oral)   Resp 18   Ht 172.7 cm (68\")   Wt 76.7 kg (169 lb 3.2 oz)   SpO2 97%   BMI 25.73 kg/m²     Physical Exam   Constitutional: He is oriented to person, place, and time. He appears well-developed and well-nourished. He is cooperative.   HENT:   Head: Normocephalic.   Right Ear: Decreased hearing is noted.   Left Ear: Decreased hearing is noted.   Neck: Trachea normal. Neck supple. Carotid bruit is not present. No thyromegaly present.   Cardiovascular: Normal rate and regular rhythm. Exam reveals no gallop and no friction rub.   No murmur heard.  Pulmonary/Chest: Effort normal and breath sounds normal. No respiratory distress. He has no wheezes. He exhibits no tenderness.   Neurological: He is alert and oriented to person, place, and time.   Skin: Skin is dry. No rash noted. Nails show no clubbing.   Nursing note and vitals reviewed.        Assessment/Plan .  Problem List Items Addressed This Visit        Medium "    Anemia    Current Assessment & Plan     Improved.  Labs done 5-, read by me, reviewed with pt.  Hemoglobin was 12.1 up from 11.9         Chronic renal failure, unspecified stage    Current Assessment & Plan     Improved.  Labs done 5-, read by me, reviewed with pt.  Creatinine was 1.74 down from 1.79, EGFR was 32 up from 31.         Diabetes mellitus type II, controlled (CMS/[facility])    Current Assessment & Plan     Labs done 5-, read by me, reviewed with pt.  Doing well  A1c was 5.7 up from 5.6  Encouraged to watch sugar intake, exercise more and lose weight. Non-compliant with medication.   Not monitoring sugar at home.   Follow up in 4 months  Care management needs are self-addressed. Self-management abilities addressed and patient is capable of managing his own disease.           Hypertension - Primary    Current Assessment & Plan     Too tight of control.  Decrease Metoprolol to 12.5 mg daily.  Encouraged to watch salt, exercise more and lose weight.           Mixed hyperlipidemia    Current Assessment & Plan     Labs done 5-, read by me, reviewed with pt.  Doing well.  Trig. 161 down from 203, Tot. Chol. 177 down from 180, HDL 36 samew as last,  up from 103.  Encouraged to watch fatty intake, exercise more, and lose weight.   Compliant with medication    Is getting adequate diet and exercise  Goals developed at last visit were met   Follow up in 4 months  Care management needs are self-addressed.  Self-management abilities addressed and patient is capable of managing his own disease.              Unprioritized    Hypernatremia    Current Assessment & Plan     Improved.  Labs done 5-, read by me, reviewed with pt.  Sodium was 144 down from 145         Neoplasm, uncertain whether benign or malignant    Current Assessment & Plan     New dx.  Raised irritated large lesion of the right upper back.  Advised excision, need to rule out carcinoma

## 2020-05-19 NOTE — ASSESSMENT & PLAN NOTE
Doing well.  Discussed referral to psychologist.  Pt. And family declines and prefers to continue treatment.  Discussion with patient about not driving due to memory and decreasing reflexes

## 2020-05-19 NOTE — ASSESSMENT & PLAN NOTE
Labs done 5-, read by me, reviewed with pt.  Doing well  A1c was 5.7 up from 5.6  Encouraged to watch sugar intake, exercise more and lose weight. Non-compliant with medication.   Not monitoring sugar at home.   Follow up in 4 months  Care management needs are self-addressed. Self-management abilities addressed and patient is capable of managing his own disease.

## 2020-06-10 ENCOUNTER — PROCEDURE VISIT (OUTPATIENT)
Dept: FAMILY MEDICINE CLINIC | Facility: CLINIC | Age: 85
End: 2020-06-10

## 2020-06-10 VITALS
HEIGHT: 68 IN | TEMPERATURE: 97.4 F | OXYGEN SATURATION: 98 % | RESPIRATION RATE: 18 BRPM | HEART RATE: 69 BPM | BODY MASS INDEX: 25.31 KG/M2 | WEIGHT: 167 LBS

## 2020-06-10 DIAGNOSIS — C44.529 SQUAMOUS CELL CARCINOMA OF UPPER BACK EXCLUDING SCAPULAR REGION: Primary | ICD-10-CM

## 2020-06-10 DIAGNOSIS — D48.9 NEOPLASM, UNCERTAIN WHETHER BENIGN OR MALIGNANT: ICD-10-CM

## 2020-06-10 PROCEDURE — 17262 DSTRJ MAL LES T/A/L 1.1-2.0: CPT | Performed by: FAMILY MEDICINE

## 2020-06-10 NOTE — ASSESSMENT & PLAN NOTE
Raised irritated lesion right upper back measures 17 mm x 15 mm, need to rule out cancer.    Area cleaned with betadine, anesthestized with 1% epinephrine and removed via shaving with a 10 blade. Lesion sent to pathology. Hemostasis with electrocautery.

## 2020-06-10 NOTE — PROGRESS NOTES
Subjective   Diego Galan is a 89 y.o. male.   Raised irritated lesion in the right upper back probable squamous cell carcinoma patient comes in for removal.z       The following portions of the patient's history were reviewed and updated as appropriate: allergies, current medications, past family history, past medical history, past social history, past surgical history and problem list.    Family History   Problem Relation Age of Onset   • Heart disease Mother    • Stroke Mother    • Heart disease Father    • Diabetes Brother    • Cancer Brother    • Malig Hyperthermia Neg Hx        Social History     Tobacco Use   • Smoking status: Never Smoker   • Smokeless tobacco: Never Used   Substance Use Topics   • Alcohol use: No   • Drug use: No       Past Surgical History:   Procedure Laterality Date   • CHEST SURGERY      shrapnel   • COLONOSCOPY     • ECTROPION REPAIR Left 6/29/2017    Procedure: LEFT LOWER LID CICATRICIAL ECTROPION OF  REPAIR WITH FULL THICKNESS SKIN GRAFT, EXCISION LESION 4CM x 2 CM WITH ROTATIONAL FLAP LEFT CHEEK 15 CM x 6 CM WITH FROZEN SECTIONS;  Surgeon: Norman Long MD;  Location: [facility];  Service:    • ECTROPION REPAIR Right 7/6/2017    Procedure: RIGHT LOWER LID CICATRICIAL ECTROPION REPAIR,FULL THICKNESS SKIN GRAFT, SAAEVDRA SUTURE;  Surgeon: Norman Long MD;  Location: [facility] OR [facility];  Service:    • EYE SURGERY      rose marie cataracts  with iol   • FOOT SURGERY Left     war injury   • LEG SURGERY Right     war injury   • MOHS SURGERY      x2       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)   • Mixed hyperlipidemia   • Chronic renal failure, unspecified stage   • Hypertensive left ventricular hypertrophy, 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   • Memory loss   •  Mitral valve disease   • Noise-induced hearing loss of both ears   • Lethargy   • Arthritis   • BPH (benign prostatic hyperplasia)   • GERD (gastroesophageal reflux disease)   • Femoral hernia of left side   • Umbilical hernia without obstruction and without gangrene   • Gait instability   • History of kidney stones   • Elevated PSA   • Knee pain   • Late onset Alzheimer's disease without behavioral disturbance (CMS/HCC)   • Chronic nonseasonal allergic rhinitis due to pollen   • Annual physical exam   • Hypernatremia   • 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     • carboxymethylcellulose (REFRESH PLUS) 0.5 % solution 1 drop 2 (Two) Times a Day As Needed for Dry Eyes.     • cephalexin (KEFLEX) 500 MG capsule Take 1 capsule by mouth 2 (Two) Times a Day. 20 capsule 0   • divalproex (DEPAKOTE) 125 MG DR tablet TAKE 1 TABLET DAILY 90 tablet 1   • erythromycin (ROMYCIN) 5 MG/GM ophthalmic ointment Place a 1/2 inch ribbon of ointment on the sutures of face and behind the ear two times daily 3.5 g 1   • fluticasone (FLONASE) 50 MCG/ACT nasal spray 2 sprays into each nostril Every Morning.     • galantamine (RAZADYNE) 4 MG tablet TAKE 1 TABLET TWICE A  tablet 1   • hydroCHLOROthiazide (HYDRODIURIL) 25 MG tablet Take 0.5 tablets by mouth Daily. 30 tablet 0   • HYDROcodone-acetaminophen (NORCO) 5-325 MG per tablet Take 1 tablet by mouth Every 6 (Six) Hours As Needed for Moderate Pain (4-6) (pain). 15 tablet 0   • HYDROcodone-acetaminophen (NORCO) 5-325 MG per tablet Take 1 tablet by mouth Every 6 (Six) Hours As Needed for Moderate Pain (4-6) (pain). 15 tablet 0   • Loratadine (CLARITIN PO) Take 1 tablet by mouth Every Morning.     • memantine (NAMENDA) 5 MG tablet Take 1 tablet by mouth 2 (Two) Times a Day. 180 tablet 3   • pantoprazole (PROTONIX) 40 MG EC tablet  "Take 1 tablet by mouth Daily. 90 tablet 3   • potassium chloride (K-DUR) 10 MEQ CR tablet Take 1 tablet by mouth Every Morning. 90 tablet 3   • QUEtiapine (SEROquel) 25 MG tablet TAKE 1 TABLET AT BEDTIME 90 tablet 1   • rosuvastatin (CRESTOR) 40 MG tablet Take 1 tablet by mouth 2 (Two) Times a Week. Monday thursday 90 tablet 3     No current facility-administered medications on file prior to visit.        No Known Allergies    Review of Systems    Objective   Visit Vitals  Pulse 69   Temp 97.4 °F (36.3 °C) (Oral)   Resp 18   Ht 172.7 cm (68\")   Wt 75.8 kg (167 lb)   SpO2 98%   BMI 25.39 kg/m²     Physical Exam   Skin:              Assessment/Plan .  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  Verbal permission is given for removal.  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

## 2020-06-16 LAB
DX ICD CODE: NORMAL
PATH REPORT.FINAL DX SPEC: NORMAL
PATH REPORT.GROSS SPEC: NORMAL
PATH REPORT.SITE OF ORIGIN SPEC: NORMAL
PATHOLOGIST NAME: NORMAL
PAYMENT PROCEDURE: NORMAL

## 2020-06-18 ENCOUNTER — TELEPHONE (OUTPATIENT)
Dept: FAMILY MEDICINE CLINIC | Facility: CLINIC | Age: 85
End: 2020-06-18

## 2020-06-18 DIAGNOSIS — J30.89 CHRONIC NONSEASONAL ALLERGIC RHINITIS DUE TO POLLEN: ICD-10-CM

## 2020-06-18 RX ORDER — MONTELUKAST SODIUM 10 MG/1
TABLET ORAL
Qty: 90 TABLET | Refills: 3 | Status: SHIPPED | OUTPATIENT
Start: 2020-06-18 | End: 2021-04-13 | Stop reason: SDUPTHER

## 2020-06-18 NOTE — TELEPHONE ENCOUNTER
Called and spoke with Mirna, daughter and she want to think about wheather to re-excise or not on his back.  She will return my call to let me know.

## 2020-06-19 PROBLEM — C44.529: Status: ACTIVE | Noted: 2020-06-19

## 2020-06-19 PROBLEM — D04.5 SQUAMOUS CELL CARCINOMA IN SITU (SCCIS) OF SKIN OF BACK: Status: ACTIVE | Noted: 2020-06-19

## 2020-06-19 NOTE — ASSESSMENT & PLAN NOTE
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

## 2020-06-22 PROBLEM — D04.5 SQUAMOUS CELL CARCINOMA IN SITU (SCCIS) OF SKIN OF BACK: Status: RESOLVED | Noted: 2020-06-19 | Resolved: 2020-06-22

## 2020-06-24 DIAGNOSIS — F02.80 LATE ONSET ALZHEIMER'S DISEASE WITHOUT BEHAVIORAL DISTURBANCE (HCC): ICD-10-CM

## 2020-06-24 DIAGNOSIS — G30.1 LATE ONSET ALZHEIMER'S DISEASE WITHOUT BEHAVIORAL DISTURBANCE (HCC): ICD-10-CM

## 2020-06-24 RX ORDER — GALANTAMINE HYDROBROMIDE 4 MG/1
TABLET, FILM COATED ORAL
Qty: 180 TABLET | Refills: 1 | Status: SHIPPED | OUTPATIENT
Start: 2020-06-24 | End: 2020-12-21

## 2020-06-24 RX ORDER — MEMANTINE HYDROCHLORIDE 5 MG/1
TABLET ORAL
Qty: 180 TABLET | Refills: 1 | Status: SHIPPED | OUTPATIENT
Start: 2020-06-24 | End: 2020-12-28

## 2020-06-30 ENCOUNTER — PROCEDURE VISIT (OUTPATIENT)
Dept: FAMILY MEDICINE CLINIC | Facility: CLINIC | Age: 85
End: 2020-06-30

## 2020-06-30 DIAGNOSIS — C44.529 SQUAMOUS CELL CARCINOMA OF UPPER BACK EXCLUDING SCAPULAR REGION: Primary | ICD-10-CM

## 2020-06-30 PROCEDURE — 11602 EXC TR-EXT MAL+MARG 1.1-2 CM: CPT | Performed by: FAMILY MEDICINE

## 2020-06-30 PROCEDURE — 12031 INTMD RPR S/A/T/EXT 2.5 CM/<: CPT | Performed by: FAMILY MEDICINE

## 2020-07-15 ENCOUNTER — OFFICE VISIT (OUTPATIENT)
Dept: FAMILY MEDICINE CLINIC | Facility: CLINIC | Age: 85
End: 2020-07-15

## 2020-07-15 DIAGNOSIS — R60.0 EDEMA OF BOTH LEGS: Primary | ICD-10-CM

## 2020-07-15 DIAGNOSIS — N18.9 CHRONIC RENAL FAILURE, UNSPECIFIED STAGE: ICD-10-CM

## 2020-07-15 DIAGNOSIS — C44.529 SQUAMOUS CELL CARCINOMA OF UPPER BACK EXCLUDING SCAPULAR REGION: ICD-10-CM

## 2020-07-15 PROCEDURE — 99213 OFFICE O/P EST LOW 20 MIN: CPT | Performed by: FAMILY MEDICINE

## 2020-07-15 NOTE — PROGRESS NOTES
Subjective   Diego Galan is a 89 y.o. male.     Suture / Staple Removal   The sutures were placed 7 to 10 days ago. He tried nothing since the wound repair. There has been no drainage from the wound. There is no redness present. There is no swelling present. There is no pain present.   Leg Swelling   This is a recurrent problem. The current episode started 1 to 4 weeks ago. The problem occurs intermittently. The problem has been gradually worsening. Nothing aggravates the symptoms.        The following portions of the patient's history were reviewed and updated as appropriate: allergies, past family history, past medical history, past social history, past surgical history and problem list.    Family History   Problem Relation Age of Onset   • Heart disease Mother    • Stroke Mother    • Heart disease Father    • Diabetes Brother    • Cancer Brother    • Malig Hyperthermia Neg Hx        Social History     Tobacco Use   • Smoking status: Never Smoker   • Smokeless tobacco: Never Used   Substance Use Topics   • Alcohol use: No   • Drug use: No       Past Surgical History:   Procedure Laterality Date   • CHEST SURGERY      shrapnel   • COLONOSCOPY     • ECTROPION REPAIR Left 6/29/2017    Procedure: LEFT LOWER LID CICATRICIAL ECTROPION OF  REPAIR WITH FULL THICKNESS SKIN GRAFT, EXCISION LESION 4CM x 2 CM WITH ROTATIONAL FLAP LEFT CHEEK 15 CM x 6 CM WITH FROZEN SECTIONS;  Surgeon: Norman Long MD;  Location: [facility];  Service:    • ECTROPION REPAIR Right 7/6/2017    Procedure: RIGHT LOWER LID CICATRICIAL ECTROPION REPAIR,FULL THICKNESS SKIN GRAFT, SAAVEDRA SUTURE;  Surgeon: Norman Long MD;  Location: [facility];  Service:    • EYE SURGERY      rose marie cataracts  with iol   • FOOT SURGERY Left     war injury   • LEG SURGERY Right     war injury   • MOHS SURGERY      x2       Patient Active Problem List   Diagnosis   • Hypertension   • Diabetes mellitus type II, controlled (CMS/[facility])   • Acoustic neuroma  (CMS/[facility])   • Anemia   • Aortic valve stenosis   • 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 (CMS/[facility])   • Mixed hyperlipidemia   • Chronic renal failure, unspecified stage   • Hypertensive left ventricular hypertrophy, 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   • Memory loss   • Mitral valve disease   • Noise-induced hearing loss of both ears   • Lethargy   • Arthritis   • BPH (benign prostatic hyperplasia)   • GERD (gastroesophageal reflux disease)   • Femoral hernia of left side   • Umbilical hernia without obstruction and without gangrene   • Gait instability   • History of kidney stones   • Elevated PSA   • Knee pain   • Late onset Alzheimer's disease without behavioral disturbance (CMS/[facility])   • Chronic nonseasonal allergic rhinitis due to pollen   • Annual physical exam   • Hypernatremia   • 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     • carboxymethylcellulose (REFRESH PLUS) 0.5 % solution 1 drop 2 (Two) Times a Day As Needed for Dry Eyes.     • divalproex (DEPAKOTE) 125 MG DR tablet TAKE 1 TABLET DAILY 90 tablet 1   • erythromycin (ROMYCIN) 5 MG/GM ophthalmic ointment Place a 1/2 inch ribbon of ointment on the sutures of face and behind the ear two times daily 3.5 g 1   • fluticasone (FLONASE) 50 MCG/ACT nasal spray 2 sprays into each nostril Every Morning.     • galantamine (RAZADYNE) 4 MG tablet TAKE 1 TABLET TWICE A  tablet 1   • hydroCHLOROthiazide (HYDRODIURIL) 25 MG tablet Take 0.5 tablets by mouth Daily. 30 tablet 0   • HYDROcodone-acetaminophen (NORCO) 5-325 MG per tablet Take 1 tablet by mouth Every 6 (Six) Hours As Needed for Moderate Pain (4-6) (pain). 15 tablet 0   • HYDROcodone-acetaminophen (NORCO) 5-325 MG per tablet Take 1 tablet by mouth Every 6 (Six) Hours As Needed for Moderate Pain  (4-6) (pain). 15 tablet 0   • Loratadine (CLARITIN PO) Take 1 tablet by mouth Every Morning.     • memantine (NAMENDA) 5 MG tablet TAKE 1 TABLET TWICE A  tablet 1   • montelukast (SINGULAIR) 10 MG tablet TAKE 1 TABLET DAILY 90 tablet 3   • pantoprazole (PROTONIX) 40 MG EC tablet Take 1 tablet by mouth Daily. 90 tablet 3   • potassium chloride (K-DUR) 10 MEQ CR tablet Take 1 tablet by mouth Every Morning. 90 tablet 3   • QUEtiapine (SEROquel) 25 MG tablet TAKE 1 TABLET AT BEDTIME 90 tablet 1   • rosuvastatin (CRESTOR) 40 MG tablet Take 1 tablet by mouth 2 (Two) Times a Week. Monday thursday 90 tablet 3     No current facility-administered medications on file prior to visit.        No Known Allergies    Review of Systems    Objective   There were no vitals taken for this visit.  Physical Exam   Constitutional: He is oriented to person, place, and time. He appears well-developed and well-nourished. He is cooperative.   HENT:   Head: Normocephalic.   Neck: Trachea normal. Neck supple. Carotid bruit is not present. No thyromegaly present.   Cardiovascular: Normal rate and regular rhythm. Exam reveals no gallop and no friction rub.   No murmur heard.  Pulmonary/Chest: Effort normal and breath sounds normal. No respiratory distress. He has no wheezes. He exhibits no tenderness.     Vascular Status -  His right foot exhibits abnormal foot edema (1 plus). His left foot exhibits abnormal foot edema (1 plus).  Neurological: He is alert and oriented to person, place, and time.   Skin: Skin is dry. No rash noted. Nails show no clubbing.        Nursing note and vitals reviewed.        Assessment/Plan .  Problem List Items Addressed This Visit        Medium    Chronic renal failure, unspecified stage    Current Assessment & Plan     Doing well for his age         Edema of both legs - Primary    Current Assessment & Plan     Worsening; recommended that he elevate legs as much as he can.  Daughter was confused on HCTZ and  metoprolol. I advised her to stop the Metoprolol and continue HCTZ -which will also help the edema.  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  Margins are clear

## 2020-07-16 PROBLEM — R60.0 EDEMA OF BOTH LEGS: Status: ACTIVE | Noted: 2020-07-16

## 2020-07-16 NOTE — ASSESSMENT & PLAN NOTE
Worsening; recommended that he elevate legs as much as he can.  Daughter was confused on HCTZ and metoprolol. I advised her to stop the Metoprolol and continue HCTZ -which will also help the edema.  Probably due to stasis edema strongly doubt congestive heart failure or renal failure as the cause

## 2020-07-16 NOTE — ASSESSMENT & PLAN NOTE
Healing well; sutures removed and discussed pathology with patient and daughter.  Margins are clear

## 2020-08-05 ENCOUNTER — OFFICE VISIT (OUTPATIENT)
Dept: FAMILY MEDICINE CLINIC | Facility: CLINIC | Age: 85
End: 2020-08-05

## 2020-08-05 VITALS
BODY MASS INDEX: 24.98 KG/M2 | HEIGHT: 68 IN | OXYGEN SATURATION: 100 % | TEMPERATURE: 96.8 F | RESPIRATION RATE: 18 BRPM | HEART RATE: 80 BPM | DIASTOLIC BLOOD PRESSURE: 80 MMHG | WEIGHT: 164.8 LBS | SYSTOLIC BLOOD PRESSURE: 125 MMHG

## 2020-08-05 DIAGNOSIS — I10 ESSENTIAL HYPERTENSION: Primary | ICD-10-CM

## 2020-08-05 DIAGNOSIS — R60.0 EDEMA OF BOTH LEGS: ICD-10-CM

## 2020-08-05 DIAGNOSIS — F32.9 REACTIVE DEPRESSION: ICD-10-CM

## 2020-08-05 PROCEDURE — 99213 OFFICE O/P EST LOW 20 MIN: CPT | Performed by: FAMILY MEDICINE

## 2020-08-05 RX ORDER — HYDROCHLOROTHIAZIDE 25 MG/1
12.5 TABLET ORAL DAILY
Qty: 30 TABLET | Refills: 0
Start: 2020-08-05 | End: 2020-09-23 | Stop reason: SDUPTHER

## 2020-08-05 NOTE — ASSESSMENT & PLAN NOTE
New dx.  2nd to COVID 19 and Pt. Not able to sit outside due to [employer] rules because of COVID 19

## 2020-08-08 PROBLEM — C44.91 BASAL CELL CARCINOMA OF SKIN: Status: ACTIVE | Noted: 2017-05-09

## 2020-09-16 DIAGNOSIS — D64.9 ANEMIA, UNSPECIFIED TYPE: ICD-10-CM

## 2020-09-16 DIAGNOSIS — E78.2 MIXED HYPERLIPIDEMIA: Primary | ICD-10-CM

## 2020-09-16 DIAGNOSIS — E11.9 CONTROLLED TYPE 2 DIABETES MELLITUS WITHOUT COMPLICATION, WITHOUT LONG-TERM CURRENT USE OF INSULIN (HCC): ICD-10-CM

## 2020-09-17 LAB
ALBUMIN SERPL-MCNC: 4 G/DL (ref 3.5–4.6)
ALBUMIN/GLOB SERPL: 1.5 {RATIO} (ref 1.2–2.2)
ALP SERPL-CCNC: 74 IU/L (ref 39–117)
ALT SERPL-CCNC: 8 IU/L (ref 0–44)
AST SERPL-CCNC: 14 IU/L (ref 0–40)
BASOPHILS # BLD AUTO: 0 X10E3/UL (ref 0–0.2)
BASOPHILS NFR BLD AUTO: 1 %
BILIRUB SERPL-MCNC: 0.3 MG/DL (ref 0–1.2)
BUN SERPL-MCNC: 27 MG/DL (ref 10–36)
BUN/CREAT SERPL: 16 (ref 10–24)
CALCIUM SERPL-MCNC: 9.1 MG/DL (ref 8.6–10.2)
CHLORIDE SERPL-SCNC: 102 MMOL/L (ref 96–106)
CHOLEST SERPL-MCNC: 165 MG/DL (ref 100–199)
CHOLEST/HDLC SERPL: 4.3 RATIO (ref 0–5)
CO2 SERPL-SCNC: 26 MMOL/L (ref 20–29)
CREAT SERPL-MCNC: 1.64 MG/DL (ref 0.76–1.27)
EOSINOPHIL # BLD AUTO: 0.5 X10E3/UL (ref 0–0.4)
EOSINOPHIL NFR BLD AUTO: 8 %
ERYTHROCYTE [DISTWIDTH] IN BLOOD BY AUTOMATED COUNT: 13 % (ref 11.6–15.4)
GLOBULIN SER CALC-MCNC: 2.6 G/DL (ref 1.5–4.5)
GLUCOSE SERPL-MCNC: 77 MG/DL (ref 65–99)
HBA1C MFR BLD: 5.5 % (ref 4.8–5.6)
HCT VFR BLD AUTO: 36.6 % (ref 37.5–51)
HDLC SERPL-MCNC: 38 MG/DL
HGB BLD-MCNC: 12.2 G/DL (ref 13–17.7)
IMM GRANULOCYTES # BLD AUTO: 0 X10E3/UL (ref 0–0.1)
IMM GRANULOCYTES NFR BLD AUTO: 0 %
LDLC SERPL CALC-MCNC: 97 MG/DL (ref 0–99)
LYMPHOCYTES # BLD AUTO: 1.9 X10E3/UL (ref 0.7–3.1)
LYMPHOCYTES NFR BLD AUTO: 33 %
MCH RBC QN AUTO: 33.4 PG (ref 26.6–33)
MCHC RBC AUTO-ENTMCNC: 33.3 G/DL (ref 31.5–35.7)
MCV RBC AUTO: 100 FL (ref 79–97)
MONOCYTES # BLD AUTO: 0.4 X10E3/UL (ref 0.1–0.9)
MONOCYTES NFR BLD AUTO: 7 %
NEUTROPHILS # BLD AUTO: 2.9 X10E3/UL (ref 1.4–7)
NEUTROPHILS NFR BLD AUTO: 51 %
PLATELET # BLD AUTO: 158 X10E3/UL (ref 150–450)
POTASSIUM SERPL-SCNC: 3.9 MMOL/L (ref 3.5–5.2)
PROT SERPL-MCNC: 6.6 G/DL (ref 6–8.5)
RBC # BLD AUTO: 3.65 X10E6/UL (ref 4.14–5.8)
SODIUM SERPL-SCNC: 145 MMOL/L (ref 134–144)
TRIGL SERPL-MCNC: 175 MG/DL (ref 0–149)
VLDLC SERPL CALC-MCNC: 30 MG/DL (ref 5–40)
WBC # BLD AUTO: 5.7 X10E3/UL (ref 3.4–10.8)

## 2020-09-23 ENCOUNTER — OFFICE VISIT (OUTPATIENT)
Dept: FAMILY MEDICINE CLINIC | Facility: CLINIC | Age: 85
End: 2020-09-23

## 2020-09-23 VITALS
WEIGHT: 162.6 LBS | TEMPERATURE: 97.5 F | OXYGEN SATURATION: 100 % | HEIGHT: 68 IN | SYSTOLIC BLOOD PRESSURE: 151 MMHG | RESPIRATION RATE: 16 BRPM | HEART RATE: 73 BPM | BODY MASS INDEX: 24.64 KG/M2 | DIASTOLIC BLOOD PRESSURE: 82 MMHG

## 2020-09-23 DIAGNOSIS — D64.9 ANEMIA, UNSPECIFIED TYPE: ICD-10-CM

## 2020-09-23 DIAGNOSIS — E87.0 HYPERNATREMIA: ICD-10-CM

## 2020-09-23 DIAGNOSIS — F02.80 LATE ONSET ALZHEIMER'S DISEASE WITHOUT BEHAVIORAL DISTURBANCE (HCC): ICD-10-CM

## 2020-09-23 DIAGNOSIS — G30.1 LATE ONSET ALZHEIMER'S DISEASE WITHOUT BEHAVIORAL DISTURBANCE (HCC): ICD-10-CM

## 2020-09-23 DIAGNOSIS — N18.30 CHRONIC RENAL FAILURE, STAGE 3 (MODERATE) (HCC): ICD-10-CM

## 2020-09-23 DIAGNOSIS — R26.81 GAIT INSTABILITY: ICD-10-CM

## 2020-09-23 DIAGNOSIS — E78.2 MIXED HYPERLIPIDEMIA: ICD-10-CM

## 2020-09-23 DIAGNOSIS — I10 ESSENTIAL HYPERTENSION: ICD-10-CM

## 2020-09-23 DIAGNOSIS — E11.9 CONTROLLED TYPE 2 DIABETES MELLITUS WITHOUT COMPLICATION, WITHOUT LONG-TERM CURRENT USE OF INSULIN (HCC): Primary | ICD-10-CM

## 2020-09-23 DIAGNOSIS — Z23 NEED FOR IMMUNIZATION AGAINST INFLUENZA: ICD-10-CM

## 2020-09-23 PROCEDURE — G0008 ADMIN INFLUENZA VIRUS VAC: HCPCS | Performed by: FAMILY MEDICINE

## 2020-09-23 PROCEDURE — 90653 IIV ADJUVANT VACCINE IM: CPT | Performed by: FAMILY MEDICINE

## 2020-09-23 PROCEDURE — 99214 OFFICE O/P EST MOD 30 MIN: CPT | Performed by: FAMILY MEDICINE

## 2020-09-23 RX ORDER — HYDROCHLOROTHIAZIDE 25 MG/1
12.5 TABLET ORAL DAILY
Qty: 30 TABLET | Refills: 0
Start: 2020-09-23 | End: 2021-08-25

## 2020-09-23 RX ORDER — ROSUVASTATIN CALCIUM 40 MG/1
40 TABLET, COATED ORAL 2 TIMES WEEKLY
Qty: 90 TABLET | Refills: 3
Start: 2020-09-24 | End: 2020-12-21 | Stop reason: SDUPTHER

## 2020-09-23 RX ORDER — DIVALPROEX SODIUM 125 MG/1
TABLET, DELAYED RELEASE ORAL
Qty: 90 TABLET | Refills: 1 | Status: SHIPPED | OUTPATIENT
Start: 2020-09-23 | End: 2021-03-15

## 2020-09-23 RX ORDER — POTASSIUM CHLORIDE 750 MG/1
10 TABLET, FILM COATED, EXTENDED RELEASE ORAL EVERY MORNING
Qty: 90 TABLET | Refills: 3
Start: 2020-09-23 | End: 2021-01-11 | Stop reason: SDUPTHER

## 2020-09-23 NOTE — ASSESSMENT & PLAN NOTE
Labs done 9-, read by me, reviewed with pt.  Trig. 175 up from 161, Tot. Chol. 165 down from 177, HDL 38 up from 36, LDL 97 down from 109.  Improved.  Patient tolerated Crestor well without side effects. I feel the benefits of the medication outweigh the risks.  Encouraged to watch fatty intake, exercise more, and lose weight.   Compliant with medication    Is getting adequate diet and exercise  Goals developed at last visit were met   Follow up in 6 months  Care management needs are self-addressed.  Self-management abilities addressed and patient is capable of managing his own disease.
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
## 2020-09-23 NOTE — ASSESSMENT & PLAN NOTE
Worse.  Labs done 9-, read by me, reviewed with pt.  Sodium was 145 up from 144.  Pt. Advised to increase water intake.

## 2020-09-23 NOTE — ASSESSMENT & PLAN NOTE
Worse, discussed possibility of CVA, offered CT Pt and family declines and will watch patient closely.

## 2020-09-23 NOTE — ASSESSMENT & PLAN NOTE
Borderline poor control.  Will hold on adding medication.  Patient tolerated HCTZ and Potassium well without side effects. I feel the benefits of the medication outweigh the risks.      Encouraged to watch salt, exercise more and lose weight.

## 2020-09-23 NOTE — ASSESSMENT & PLAN NOTE
Labs done 9-, read by me, reviewed with pt.  A1c 5.5 down from 5.7  Improved.   Encouraged to watch fatty intake, exercise more, and lose weight.   No medication    Is getting adequate diet and exercise  Goals developed at last visit were met.  Follow up in 6 months  Care management needs are self-addressed.  Self-management abilities addressed and patient is capable of managing his own disease.

## 2020-09-23 NOTE — ASSESSMENT & PLAN NOTE
Improved.  Labs done 9-, read by me, reviewed with pt.  Creatinine was 1.64 down from 1.74, EGFR was 34 up from 32.  Repeat with next labs

## 2020-09-25 DIAGNOSIS — G30.1 LATE ONSET ALZHEIMER'S DISEASE WITHOUT BEHAVIORAL DISTURBANCE (HCC): ICD-10-CM

## 2020-09-25 DIAGNOSIS — F02.80 LATE ONSET ALZHEIMER'S DISEASE WITHOUT BEHAVIORAL DISTURBANCE (HCC): ICD-10-CM

## 2020-09-26 PROBLEM — N18.30 CHRONIC RENAL FAILURE, STAGE 3 (MODERATE): Status: ACTIVE | Noted: 2020-02-06

## 2020-09-28 RX ORDER — QUETIAPINE FUMARATE 25 MG/1
TABLET, FILM COATED ORAL
Qty: 90 TABLET | Refills: 1 | Status: SHIPPED | OUTPATIENT
Start: 2020-09-28 | End: 2021-04-05

## 2020-12-21 ENCOUNTER — TELEPHONE (OUTPATIENT)
Dept: FAMILY MEDICINE CLINIC | Facility: CLINIC | Age: 85
End: 2020-12-21

## 2020-12-21 DIAGNOSIS — F02.80 LATE ONSET ALZHEIMER'S DISEASE WITHOUT BEHAVIORAL DISTURBANCE (HCC): ICD-10-CM

## 2020-12-21 DIAGNOSIS — K21.9 GASTROESOPHAGEAL REFLUX DISEASE: ICD-10-CM

## 2020-12-21 DIAGNOSIS — G30.1 LATE ONSET ALZHEIMER'S DISEASE WITHOUT BEHAVIORAL DISTURBANCE (HCC): ICD-10-CM

## 2020-12-21 DIAGNOSIS — E78.2 MIXED HYPERLIPIDEMIA: ICD-10-CM

## 2020-12-21 RX ORDER — GALANTAMINE HYDROBROMIDE 4 MG/1
TABLET, FILM COATED ORAL
Qty: 180 TABLET | Refills: 1 | Status: SHIPPED | OUTPATIENT
Start: 2020-12-21 | End: 2021-04-13 | Stop reason: SDUPTHER

## 2020-12-21 RX ORDER — ROSUVASTATIN CALCIUM 40 MG/1
40 TABLET, COATED ORAL 2 TIMES WEEKLY
Qty: 90 TABLET | Refills: 0
Start: 2020-12-21 | End: 2021-03-15 | Stop reason: SDUPTHER

## 2020-12-21 RX ORDER — PANTOPRAZOLE SODIUM 40 MG/1
40 TABLET, DELAYED RELEASE ORAL DAILY
Qty: 90 TABLET | Refills: 0 | Status: SHIPPED | OUTPATIENT
Start: 2020-12-21 | End: 2021-03-15

## 2020-12-28 DIAGNOSIS — G30.1 LATE ONSET ALZHEIMER'S DISEASE WITHOUT BEHAVIORAL DISTURBANCE (HCC): ICD-10-CM

## 2020-12-28 DIAGNOSIS — F02.80 LATE ONSET ALZHEIMER'S DISEASE WITHOUT BEHAVIORAL DISTURBANCE (HCC): ICD-10-CM

## 2020-12-28 RX ORDER — MEMANTINE HYDROCHLORIDE 5 MG/1
TABLET ORAL
Qty: 180 TABLET | Refills: 1 | Status: SHIPPED | OUTPATIENT
Start: 2020-12-28 | End: 2021-04-13

## 2021-01-11 DIAGNOSIS — I10 ESSENTIAL HYPERTENSION: ICD-10-CM

## 2021-01-11 RX ORDER — POTASSIUM CHLORIDE 750 MG/1
10 TABLET, FILM COATED, EXTENDED RELEASE ORAL EVERY MORNING
Qty: 90 TABLET | Refills: 3
Start: 2021-01-11 | End: 2021-03-15 | Stop reason: SDUPTHER

## 2021-02-24 ENCOUNTER — TELEPHONE (OUTPATIENT)
Dept: FAMILY MEDICINE CLINIC | Facility: CLINIC | Age: 86
End: 2021-02-24

## 2021-02-24 NOTE — TELEPHONE ENCOUNTER
Caller:  Mirna Wick   Relationship to patient: Emergency Contact    Best call back number: [phone]    Patient is needing: Patients daughter is asking for a 30 minute appointment slot early in the morning for her father. Daughter stated that Dr Milner sends patient to have labs drawn after appointment and she doesn't want him to have to fast until afternoon.        Please call patient and advise

## 2021-03-15 DIAGNOSIS — F02.80 LATE ONSET ALZHEIMER'S DISEASE WITHOUT BEHAVIORAL DISTURBANCE (HCC): ICD-10-CM

## 2021-03-15 DIAGNOSIS — K21.9 GASTROESOPHAGEAL REFLUX DISEASE: ICD-10-CM

## 2021-03-15 DIAGNOSIS — E78.2 MIXED HYPERLIPIDEMIA: ICD-10-CM

## 2021-03-15 DIAGNOSIS — G30.1 LATE ONSET ALZHEIMER'S DISEASE WITHOUT BEHAVIORAL DISTURBANCE (HCC): ICD-10-CM

## 2021-03-15 DIAGNOSIS — I10 ESSENTIAL HYPERTENSION: ICD-10-CM

## 2021-03-15 RX ORDER — POTASSIUM CHLORIDE 750 MG/1
10 TABLET, FILM COATED, EXTENDED RELEASE ORAL EVERY MORNING
Qty: 90 TABLET | Refills: 0
Start: 2021-03-15 | End: 2021-04-01 | Stop reason: SDUPTHER

## 2021-03-15 RX ORDER — PANTOPRAZOLE SODIUM 40 MG/1
TABLET, DELAYED RELEASE ORAL
Qty: 90 TABLET | Refills: 0 | Status: SHIPPED | OUTPATIENT
Start: 2021-03-15 | End: 2021-04-13 | Stop reason: SDUPTHER

## 2021-03-15 RX ORDER — DIVALPROEX SODIUM 125 MG/1
TABLET, DELAYED RELEASE ORAL
Qty: 90 TABLET | Refills: 1 | Status: SHIPPED | OUTPATIENT
Start: 2021-03-15 | End: 2021-04-13

## 2021-03-15 RX ORDER — ROSUVASTATIN CALCIUM 40 MG/1
40 TABLET, COATED ORAL 2 TIMES WEEKLY
Qty: 90 TABLET | Refills: 0
Start: 2021-03-15 | End: 2021-04-01 | Stop reason: SDUPTHER

## 2021-03-15 NOTE — TELEPHONE ENCOUNTER
Caller: WickMirna    Relationship: Emergency Contact    Best call back number: [phone]     Medication needed:   Requested Prescriptions     Pending Prescriptions Disp Refills   • rosuvastatin (Crestor) 40 MG tablet 90 tablet 0     Sig: Take 1 tablet by mouth 2 (Two) Times a Week. Monday thursday   • potassium chloride 10 MEQ CR tablet 90 tablet 3     Sig: Take 1 tablet by mouth Every Morning.       When do you need the refill by: 03/17/21    What details did the patient provide when requesting the medication: HAS ABOUT A WEEK LEFT BUT IS COMING THROUGH MAIL ORDER.    Does the patient have less than a 3 day supply:  [x] Yes  [] No    What is the patient's preferred pharmacy: [facility] - [place], [place] - [address] AT [address]AL TO [facility] - [phone] [facility] [phone] FX           DAUGHTER STATES IF FOR SOME REASON CAN NOT SEND TO MAIL ORDER CAN SOMEONE PLEASE REACH OUT TO HER.

## 2021-04-01 ENCOUNTER — OFFICE VISIT (OUTPATIENT)
Dept: FAMILY MEDICINE CLINIC | Facility: CLINIC | Age: 86
End: 2021-04-01

## 2021-04-01 VITALS
WEIGHT: 160.6 LBS | OXYGEN SATURATION: 96 % | DIASTOLIC BLOOD PRESSURE: 72 MMHG | TEMPERATURE: 97.3 F | HEART RATE: 89 BPM | RESPIRATION RATE: 15 BRPM | BODY MASS INDEX: 24.34 KG/M2 | HEIGHT: 68 IN | SYSTOLIC BLOOD PRESSURE: 147 MMHG

## 2021-04-01 DIAGNOSIS — E78.2 MIXED HYPERLIPIDEMIA: ICD-10-CM

## 2021-04-01 DIAGNOSIS — N39.498 OTHER URINARY INCONTINENCE: ICD-10-CM

## 2021-04-01 DIAGNOSIS — E11.9 CONTROLLED TYPE 2 DIABETES MELLITUS WITHOUT COMPLICATION, WITHOUT LONG-TERM CURRENT USE OF INSULIN (HCC): ICD-10-CM

## 2021-04-01 DIAGNOSIS — I25.709 ATHEROSCLEROSIS OF CORONARY ARTERY BYPASS GRAFT OF NATIVE HEART WITH ANGINA PECTORIS (HCC): ICD-10-CM

## 2021-04-01 DIAGNOSIS — I10 ESSENTIAL HYPERTENSION: ICD-10-CM

## 2021-04-01 DIAGNOSIS — R53.83 LETHARGY: ICD-10-CM

## 2021-04-01 DIAGNOSIS — I10 ESSENTIAL HYPERTENSION: Primary | ICD-10-CM

## 2021-04-01 PROBLEM — R32 URINARY INCONTINENCE: Status: ACTIVE | Noted: 2021-04-01

## 2021-04-01 LAB
BILIRUB BLD-MCNC: NEGATIVE MG/DL
CLARITY, POC: CLEAR
COLOR UR: YELLOW
GLUCOSE UR STRIP-MCNC: NEGATIVE MG/DL
KETONES UR QL: NEGATIVE
LEUKOCYTE EST, POC: NEGATIVE
NITRITE UR-MCNC: NEGATIVE MG/ML
PH UR: 6 [PH] (ref 5–8)
POC MICROALBUMIN URINE: 0
PROT UR STRIP-MCNC: NEGATIVE MG/DL
RBC # UR STRIP: NEGATIVE /UL
SP GR UR: 1.01 (ref 1–1.03)
UROBILINOGEN UR QL: NORMAL

## 2021-04-01 PROCEDURE — 99214 OFFICE O/P EST MOD 30 MIN: CPT | Performed by: FAMILY MEDICINE

## 2021-04-01 PROCEDURE — 93000 ELECTROCARDIOGRAM COMPLETE: CPT | Performed by: FAMILY MEDICINE

## 2021-04-01 PROCEDURE — G0439 PPPS, SUBSEQ VISIT: HCPCS | Performed by: FAMILY MEDICINE

## 2021-04-01 PROCEDURE — 81002 URINALYSIS NONAUTO W/O SCOPE: CPT | Performed by: FAMILY MEDICINE

## 2021-04-01 PROCEDURE — 82044 UR ALBUMIN SEMIQUANTITATIVE: CPT | Performed by: FAMILY MEDICINE

## 2021-04-01 RX ORDER — POTASSIUM CHLORIDE 750 MG/1
10 TABLET, FILM COATED, EXTENDED RELEASE ORAL EVERY MORNING
Qty: 90 TABLET | Refills: 1
Start: 2021-04-01 | End: 2021-04-01 | Stop reason: SDUPTHER

## 2021-04-01 RX ORDER — POTASSIUM CHLORIDE 750 MG/1
10 TABLET, FILM COATED, EXTENDED RELEASE ORAL EVERY MORNING
Qty: 90 TABLET | Refills: 1 | Status: SHIPPED | OUTPATIENT
Start: 2021-04-01 | End: 2021-08-25

## 2021-04-01 RX ORDER — ROSUVASTATIN CALCIUM 40 MG/1
40 TABLET, COATED ORAL 2 TIMES WEEKLY
Qty: 90 TABLET | Refills: 1
Start: 2021-04-01 | End: 2021-04-01 | Stop reason: SDUPTHER

## 2021-04-01 RX ORDER — ROSUVASTATIN CALCIUM 40 MG/1
40 TABLET, COATED ORAL 2 TIMES WEEKLY
Qty: 90 TABLET | Refills: 1 | Status: SHIPPED | OUTPATIENT
Start: 2021-04-01 | End: 2021-04-13

## 2021-04-01 NOTE — ASSESSMENT & PLAN NOTE
Doing well; Advised patient to stop HCTZ. Encouraged to watch salt, exercise more and lose weight.

## 2021-04-01 NOTE — ASSESSMENT & PLAN NOTE
Doing well; EKG done today and read by myself shows normal sinus rhythm with 1 degree AV block and nonspecific changes. Stable from 2-2020

## 2021-04-01 NOTE — ASSESSMENT & PLAN NOTE
Microalbumin done and was negative.  Monofilament foot exam was done and was WNL. No ulcers seen on the feet. Eye exam not up to date.  Patient encouraged to check blood sugar daily. Encourged to watch sugar intake, exercise more, lose weight.  Labs drawn today and will discuss results at next visit.

## 2021-04-01 NOTE — PROGRESS NOTES
QUICK REFERENCE INFORMATION:  The ABCs of the Annual Wellness Visit    Medicare visit type: Subsequent     HEALTH RISK ASSESSMENT    : 1921    Recent Hospitalizations:  No hospitalization(s) within the last year..    Current Medical Providers:  Patient Care Team:  Alvin Milner MD as PCP - General (Family Medicine)  His dentist is Dr. Tellez.  His optometrist is Dr. elliott  Smoking Status:  Social History     Tobacco Use   Smoking Status Never Smoker   Smokeless Tobacco Never Used       Alcohol Consumption:  Social History     Substance and Sexual Activity   Alcohol Use No       Depression Screen:   PHQ-9 Depression Screening  Little interest or pleasure in doing things? 0   Feeling down, depressed, or hopeless? 0   Trouble falling or staying asleep, or sleeping too much? 0   Feeling tired or having little energy? 0   Poor appetite or overeating? 0   Feeling bad about yourself - or that you are a failure or have let yourself or your family down? 0   Trouble concentrating on things, such as reading the newspaper or watching television? 0   Moving or speaking so slowly that other people could have noticed? Or the opposite - being so fidgety or restless that you have been moving around a lot more than usual? 0   Thoughts that you would be better off dead, or of hurting yourself in some way? 0   PHQ-9 Total Score 0   If you checked off any problems, how difficult have these problems made it for you to do your work, take care of things at home, or get along with other people? Not difficult at all      PHQ-2/PHQ-9 Depression Screening 2021   Little interest or pleasure in doing things 0   Feeling down, depressed, or hopeless 0   Trouble falling or staying asleep, or sleeping too much 0   Feeling tired or having little energy 0   Poor appetite or overeating 0   Feeling bad about yourself - or that you are a failure or have let yourself or your family down 0   Trouble concentrating on things, such as reading  the newspaper or watching television 0   Moving or speaking so slowly that other people could have noticed. Or the opposite - being so fidgety or restless that you have been moving around a lot more than usual 0   Thoughts that you would be better off dead, or of hurting yourself in some way 0   Total Score 0   If you checked off any problems, how difficult have these problems made it for you to do your work, take care of things at home, or get along with other people? Not difficult at all     We spent 6 min asking patient questions, counseling and documenting in the chart patients risk of depression.    Health Habits and Functional and Cognitive Screening:  Functional & Cognitive Status 4/1/2021   Do you have difficulty preparing food and eating? Yes   Do you have difficulty bathing yourself, getting dressed or grooming yourself? No   Do you have difficulty using the toilet? No   Do you have difficulty moving around from place to place? No   Do you have trouble with steps or getting out of a bed or a chair? No   Current Diet Well Balanced Diet   Dental Exam Not up to date   Eye Exam Not up to date   Exercise (times per week) 2 times per week   Current Exercise Activities Include Walking   Do you need help using the phone?  Yes   Are you deaf or do you have serious difficulty hearing?  Yes   Do you need help with transportation? Yes   Do you need help shopping? Yes   Do you need help preparing meals?  Yes   Do you need help with housework?  Yes   Do you need help with laundry? Yes   Do you need help taking your medications? Yes   Do you need help managing money? Yes   Do you ever drive or ride in a car without wearing a seat belt? No   Have you felt unusual stress, anger or loneliness in the last month? No   Who do you live with? Community   If you need help, do you have trouble finding someone available to you? No   Have you been bothered in the last four weeks by sexual problems? -   Do you have difficulty  concentrating, remembering or making decisions? Yes       Does the patient have evidence of cognitive impairment? Yes    Asiprin use counseling: Taking ASA appropriately as indicated    Recent Lab Results:    Lab Results   Component Value Date    GLU 85 04/01/2021     Lab Results   Component Value Date    HGBA1C 5.5 09/16/2020     Lab Results   Component Value Date    CHOL 158 05/23/2019    TRIG 263 (H) 04/01/2021    HDL 39 (L) 04/01/2021    VLDL 45 (H) 04/01/2021       Age-appropriate Screening Schedule:  Refer to the list below for future screening recommendations based on patient's age, sex and/or medical conditions. Orders for these recommended tests are listed in the plan section. The patient has been provided with a written plan.    Health Maintenance   Topic Date Due   • TDAP/TD VACCINES (1 - Tdap) Never done   • DIABETIC EYE EXAM  02/04/2020   • HEMOGLOBIN A1C  03/16/2021   • INFLUENZA VACCINE  08/01/2021   • LIPID PANEL  04/01/2022   • URINE MICROALBUMIN  04/01/2022   • ZOSTER VACCINE  Completed       Immunizations reviewed and the patient was encouraged to update.  The patient agrees.       Subjective   History of Present Illness    Diego Galan is a 89 y.o. male who presents for an Annual Wellness Visit.  Hypertension  This is a chronic problem. The current episode started more than 1 year ago. The problem has been gradually improving since onset. The problem is controlled. Pertinent negatives include no chest pain, palpitations or shortness of breath. Risk factors for coronary artery disease include dyslipidemia.   Difficulty Urinating  This is a recurrent problem. The current episode started more than 1 month ago. The problem occurs intermittently. The problem has been gradually worsening. Pertinent negatives include no chest pain, fatigue or joint swelling. Nothing aggravates the symptoms. He has tried nothing for the symptoms.   Coronary Artery Disease  Presents for follow-up visit. Symptoms  include muscle weakness. Pertinent negatives include no chest pain, chest pressure, chest tightness, palpitations or shortness of breath. The symptoms have been stable. Compliance with diet is good. Compliance with exercise is variable. Compliance with medications is good.   Diabetes  He presents for his follow-up diabetic visit. He has type 2 diabetes mellitus. His disease course has been fluctuating. There are no hypoglycemic associated symptoms. Pertinent negatives for diabetes include no chest pain and no fatigue. There are no hypoglycemic complications. There are no diabetic complications.       The following portions of the patient's history were reviewed and updated as appropriate: current medications, past family history, past medical history, past social history, past surgical history and problem list.    Outpatient Medications Prior to Visit   Medication Sig Dispense Refill   • aspirin 81 MG chewable tablet Chew.     • carboxymethylcellulose (REFRESH PLUS) 0.5 % solution 1 drop 2 (Two) Times a Day As Needed for Dry Eyes.     • divalproex (DEPAKOTE) 125 MG DR tablet TAKE 1 TABLET DAILY 90 tablet 1   • erythromycin (ROMYCIN) 5 MG/GM ophthalmic ointment Place a 1/2 inch ribbon of ointment on the sutures of face and behind the ear two times daily 3.5 g 1   • galantamine (RAZADYNE) 4 MG tablet TAKE 1 TABLET TWICE A  tablet 1   • hydroCHLOROthiazide (HYDRODIURIL) 25 MG tablet Take 0.5 tablets by mouth Daily. 30 tablet 0   • memantine (NAMENDA) 5 MG tablet TAKE 1 TABLET TWICE A  tablet 1   • montelukast (SINGULAIR) 10 MG tablet TAKE 1 TABLET DAILY 90 tablet 3   • pantoprazole (PROTONIX) 40 MG EC tablet TAKE 1 TABLET DAILY 90 tablet 0   • potassium chloride 10 MEQ CR tablet Take 1 tablet by mouth Every Morning. 90 tablet 0   • QUEtiapine (SEROquel) 25 MG tablet TAKE 1 TABLET AT BEDTIME 90 tablet 1   • rosuvastatin (Crestor) 40 MG tablet Take 1 tablet by mouth 2 (Two) Times a Week. Monday thursday 90  tablet 0   • fluticasone (FLONASE) 50 MCG/ACT nasal spray 2 sprays into each nostril Every Morning.     • Loratadine (CLARITIN PO) Take 1 tablet by mouth Every Morning.     • HYDROcodone-acetaminophen (NORCO) 5-325 MG per tablet Take 1 tablet by mouth Every 6 (Six) Hours As Needed for Moderate Pain (4-6) (pain). 15 tablet 0   • HYDROcodone-acetaminophen (NORCO) 5-325 MG per tablet Take 1 tablet by mouth Every 6 (Six) Hours As Needed for Moderate Pain (4-6) (pain). 15 tablet 0     No facility-administered medications prior to visit.       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   • Memory loss   • Mitral valve disease   • Noise-induced hearing loss of both ears   • Lethargy   • Arthritis   • BPH (benign prostatic hyperplasia)   • GERD (gastroesophageal reflux disease)   • Femoral hernia of left side   • Umbilical hernia without obstruction and without gangrene   • Gait instability   • History of kidney stones   • Elevated PSA   • Knee pain   • Late onset Alzheimer's disease without behavioral disturbance (CMS/HCC)   • Chronic nonseasonal allergic rhinitis due to pollen   • Annual physical exam   • Hypernatremia   • 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 Patient has an advance directive in EMR which is still valid.     Discussion with Family regarding advanced directives. POST form discussed at length and reviewed with patient.  Patient states he does not want CPR. Reviewed medical interventions with patient and the differences between each: Comfort, Limited and Full. Patient opted for Limited. Discussed the use of antibiotics at the end of life. He chose to use antibiotics consistent with treatment goals. Discussed artificially administered nutrition, patient is aware that if he is alert and oriented they can change their mind at any time. However, they have elected to have no artificial nutrition. Patient has identified his daughter Mirna Wick as his healthcare representative. Patient encouraged to have a family meeting to discuss his decision regarding advanced care directives and goals of care.    In regard to the POST form:The patient opted to complete POST while in the office and copy scanned into the chart.  We spent 17 minutes discussing Advanced Care Planning information and documenting in the chart.    Identification of Risk Factors:  Risk factors include: Advance Directive Discussion.    Review of Systems   Constitutional: Negative for fatigue.   HENT: Negative for hearing loss.    Respiratory: Negative for chest tightness and shortness of breath.    Cardiovascular: Negative for chest pain and palpitations.   Genitourinary: Positive for difficulty urinating. Negative for frequency.        Nocturia   Musculoskeletal: Positive for muscle weakness. Negative for joint swelling.   Neurological: Negative for memory problem.       Compared to one year ago, the patient feels his physical health is the same.  Compared to one year ago, the patient feels his mental health is the same.    Objective     Physical Exam  Vitals and nursing note reviewed.   Constitutional:       Appearance: He is well-developed.   HENT:      Head: Normocephalic.   Neck:      Thyroid: No thyromegaly.      Vascular: No carotid bruit.      Trachea: Trachea normal.   Cardiovascular:      Rate and Rhythm: Normal rate and regular rhythm.      Heart sounds: No murmur  "heard.   No friction rub. No gallop.    Pulmonary:      Effort: Pulmonary effort is normal. No respiratory distress.      Breath sounds: Normal breath sounds. No wheezing.   Chest:      Chest wall: No tenderness.   Musculoskeletal:      Cervical back: Neck supple.   Skin:     General: Skin is dry.      Findings: No rash.      Nails: There is no clubbing.   Neurological:      Mental Status: He is alert and oriented to person, place, and time.   Psychiatric:         Behavior: Behavior is cooperative.         Vitals:    04/01/21 0856   BP: 147/72   BP Location: Left arm   Patient Position: Sitting   Cuff Size: Adult   Pulse: 89   Resp: 15   Temp: 97.3 °F (36.3 °C)   SpO2: 96%   Weight: 72.8 kg (160 lb 9.6 oz)   Height: 172.7 cm (68\")       Patient's Body mass index is 24.42 kg/m². BMI is above normal parameters. Recommendations include: educational material.      Assessment/Plan   Patient Self-Management and Personalized Health Advice  The patient has been provided with information about: diet and preventive services including:   · Annual Wellness Visit (AWV).    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 Stable from 2-2020             Medium    Diabetes mellitus type II, controlled (CMS/HCC)    Current Assessment & Plan     Microalbumin done and was negative.  Monofilament foot exam was done and was WNL. No ulcers seen on the feet. Eye exam not up to date.  Patient encouraged to check blood sugar daily. Encourged to watch sugar intake, exercise more, lose weight.  Labs drawn today and will discuss results at next visit.         Relevant Orders    POCT urinalysis dipstick, manual (Completed)    POC Microalbumin (Completed)    Hypertension - " Primary    Current Assessment & Plan     Doing well; Advised patient to stop HCTZ. Encouraged to watch salt, exercise more and lose weight.           Relevant Medications    potassium chloride 10 MEQ CR tablet    Mixed hyperlipidemia    Current Assessment & Plan     Labs drawn today and discuss results at next visit.          Relevant Medications    rosuvastatin (Crestor) 40 MG tablet    Other Relevant Orders    Lipid Panel With / Chol / HDL Ratio (Completed)    Comprehensive Metabolic Panel (Completed)       Unprioritized    Lethargy    Current Assessment & Plan     Mild- Labs drawn today and discuss results at next visit.          Relevant Orders    TSH (Completed)    CBC & Differential (Completed)    Urinary incontinence    Current Assessment & Plan     New diagnosis- Advised patient to stop HCTZ at this time.                Outpatient Encounter Medications as of 4/1/2021   Medication Sig Dispense Refill   • aspirin 81 MG chewable tablet Chew.     • carboxymethylcellulose (REFRESH PLUS) 0.5 % solution 1 drop 2 (Two) Times a Day As Needed for Dry Eyes.     • divalproex (DEPAKOTE) 125 MG DR tablet TAKE 1 TABLET DAILY 90 tablet 1   • erythromycin (ROMYCIN) 5 MG/GM ophthalmic ointment Place a 1/2 inch ribbon of ointment on the sutures of face and behind the ear two times daily 3.5 g 1   • galantamine (RAZADYNE) 4 MG tablet TAKE 1 TABLET TWICE A  tablet 1   • hydroCHLOROthiazide (HYDRODIURIL) 25 MG tablet Take 0.5 tablets by mouth Daily. 30 tablet 0   • memantine (NAMENDA) 5 MG tablet TAKE 1 TABLET TWICE A  tablet 1   • montelukast (SINGULAIR) 10 MG tablet TAKE 1 TABLET DAILY 90 tablet 3   • pantoprazole (PROTONIX) 40 MG EC tablet TAKE 1 TABLET DAILY 90 tablet 0   • [DISCONTINUED] potassium chloride 10 MEQ CR tablet Take 1 tablet by mouth Every Morning. 90 tablet 0   • [DISCONTINUED] QUEtiapine (SEROquel) 25 MG tablet TAKE 1 TABLET AT BEDTIME 90 tablet 1   • [DISCONTINUED] rosuvastatin (Crestor) 40 MG  tablet Take 1 tablet by mouth 2 (Two) Times a Week. Monday thursday 90 tablet 0   • fluticasone (FLONASE) 50 MCG/ACT nasal spray 2 sprays into each nostril Every Morning.     • Loratadine (CLARITIN PO) Take 1 tablet by mouth Every Morning.     • potassium chloride 10 MEQ CR tablet Take 1 tablet by mouth Every Morning. 90 tablet 1   • rosuvastatin (Crestor) 40 MG tablet Take 1 tablet by mouth 2 (Two) Times a Week. Monday thursday 90 tablet 1   • [DISCONTINUED] HYDROcodone-acetaminophen (NORCO) 5-325 MG per tablet Take 1 tablet by mouth Every 6 (Six) Hours As Needed for Moderate Pain (4-6) (pain). 15 tablet 0   • [DISCONTINUED] HYDROcodone-acetaminophen (NORCO) 5-325 MG per tablet Take 1 tablet by mouth Every 6 (Six) Hours As Needed for Moderate Pain (4-6) (pain). 15 tablet 0     No facility-administered encounter medications on file as of 4/1/2021.       Reviewed use of high risk medication in the elderly: yes  Reviewed for potential of harmful drug interactions in the elderly: yes    Follow Up:  No follow-ups on file.     An After Visit Summary and PPPS with all of these plans were given to the patient.

## 2021-04-02 LAB
ALBUMIN SERPL-MCNC: 4.2 G/DL (ref 3.5–4.6)
ALBUMIN/GLOB SERPL: 1.5 {RATIO} (ref 1.2–2.2)
ALP SERPL-CCNC: 79 IU/L (ref 39–117)
ALT SERPL-CCNC: 8 IU/L (ref 0–44)
AST SERPL-CCNC: 18 IU/L (ref 0–40)
BASOPHILS # BLD AUTO: 0 X10E3/UL (ref 0–0.2)
BASOPHILS NFR BLD AUTO: 0 %
BILIRUB SERPL-MCNC: 0.3 MG/DL (ref 0–1.2)
BUN SERPL-MCNC: 25 MG/DL (ref 10–36)
BUN/CREAT SERPL: 16 (ref 10–24)
CALCIUM SERPL-MCNC: 9.3 MG/DL (ref 8.6–10.2)
CHLORIDE SERPL-SCNC: 101 MMOL/L (ref 96–106)
CHOLEST SERPL-MCNC: 185 MG/DL (ref 100–199)
CHOLEST/HDLC SERPL: 4.7 RATIO (ref 0–5)
CO2 SERPL-SCNC: 26 MMOL/L (ref 20–29)
CREAT SERPL-MCNC: 1.53 MG/DL (ref 0.76–1.27)
EOSINOPHIL # BLD AUTO: 0.1 X10E3/UL (ref 0–0.4)
EOSINOPHIL NFR BLD AUTO: 2 %
ERYTHROCYTE [DISTWIDTH] IN BLOOD BY AUTOMATED COUNT: 12.8 % (ref 11.6–15.4)
GLOBULIN SER CALC-MCNC: 2.8 G/DL (ref 1.5–4.5)
GLUCOSE SERPL-MCNC: 85 MG/DL (ref 65–99)
HCT VFR BLD AUTO: 38.7 % (ref 37.5–51)
HDLC SERPL-MCNC: 39 MG/DL
HGB BLD-MCNC: 12.8 G/DL (ref 13–17.7)
IMM GRANULOCYTES # BLD AUTO: 0 X10E3/UL (ref 0–0.1)
IMM GRANULOCYTES NFR BLD AUTO: 0 %
LDLC SERPL CALC-MCNC: 101 MG/DL (ref 0–99)
LYMPHOCYTES # BLD AUTO: 1.8 X10E3/UL (ref 0.7–3.1)
LYMPHOCYTES NFR BLD AUTO: 27 %
MCH RBC QN AUTO: 32.9 PG (ref 26.6–33)
MCHC RBC AUTO-ENTMCNC: 33.1 G/DL (ref 31.5–35.7)
MCV RBC AUTO: 100 FL (ref 79–97)
MONOCYTES # BLD AUTO: 0.5 X10E3/UL (ref 0.1–0.9)
MONOCYTES NFR BLD AUTO: 8 %
NEUTROPHILS # BLD AUTO: 4.1 X10E3/UL (ref 1.4–7)
NEUTROPHILS NFR BLD AUTO: 63 %
PLATELET # BLD AUTO: 175 X10E3/UL (ref 150–450)
POTASSIUM SERPL-SCNC: 4.1 MMOL/L (ref 3.5–5.2)
PROT SERPL-MCNC: 7 G/DL (ref 6–8.5)
RBC # BLD AUTO: 3.89 X10E6/UL (ref 4.14–5.8)
SODIUM SERPL-SCNC: 142 MMOL/L (ref 134–144)
TRIGL SERPL-MCNC: 263 MG/DL (ref 0–149)
TSH SERPL DL<=0.005 MIU/L-ACNC: 3.53 UIU/ML (ref 0.45–4.5)
VLDLC SERPL CALC-MCNC: 45 MG/DL (ref 5–40)
WBC # BLD AUTO: 6.6 X10E3/UL (ref 3.4–10.8)

## 2021-04-05 DIAGNOSIS — G30.1 LATE ONSET ALZHEIMER'S DISEASE WITHOUT BEHAVIORAL DISTURBANCE (HCC): ICD-10-CM

## 2021-04-05 DIAGNOSIS — F02.80 LATE ONSET ALZHEIMER'S DISEASE WITHOUT BEHAVIORAL DISTURBANCE (HCC): ICD-10-CM

## 2021-04-05 RX ORDER — QUETIAPINE FUMARATE 25 MG/1
TABLET, FILM COATED ORAL
Qty: 90 TABLET | Refills: 1 | Status: SHIPPED | OUTPATIENT
Start: 2021-04-05 | End: 2021-04-13

## 2021-04-13 ENCOUNTER — OFFICE VISIT (OUTPATIENT)
Dept: FAMILY MEDICINE CLINIC | Facility: CLINIC | Age: 86
End: 2021-04-13

## 2021-04-13 VITALS
HEIGHT: 68 IN | OXYGEN SATURATION: 98 % | WEIGHT: 164.2 LBS | SYSTOLIC BLOOD PRESSURE: 146 MMHG | RESPIRATION RATE: 18 BRPM | DIASTOLIC BLOOD PRESSURE: 76 MMHG | TEMPERATURE: 97.2 F | BODY MASS INDEX: 24.89 KG/M2 | HEART RATE: 70 BPM

## 2021-04-13 DIAGNOSIS — M19.90 ARTHRITIS: ICD-10-CM

## 2021-04-13 DIAGNOSIS — N39.498 OTHER URINARY INCONTINENCE: ICD-10-CM

## 2021-04-13 DIAGNOSIS — F32.9 REACTIVE DEPRESSION: ICD-10-CM

## 2021-04-13 DIAGNOSIS — E87.0 HYPERNATREMIA: ICD-10-CM

## 2021-04-13 DIAGNOSIS — K41.90 FEMORAL HERNIA OF LEFT SIDE: ICD-10-CM

## 2021-04-13 DIAGNOSIS — F03.91 DEMENTIA WITH BEHAVIORAL DISTURBANCE, UNSPECIFIED DEMENTIA TYPE: Primary | ICD-10-CM

## 2021-04-13 DIAGNOSIS — I10 ESSENTIAL HYPERTENSION: ICD-10-CM

## 2021-04-13 DIAGNOSIS — K21.9 GASTROESOPHAGEAL REFLUX DISEASE, UNSPECIFIED WHETHER ESOPHAGITIS PRESENT: ICD-10-CM

## 2021-04-13 DIAGNOSIS — M25.561 CHRONIC PAIN OF BOTH KNEES: ICD-10-CM

## 2021-04-13 DIAGNOSIS — I44.0 AV BLOCK, 1ST DEGREE: ICD-10-CM

## 2021-04-13 DIAGNOSIS — D64.9 ANEMIA, UNSPECIFIED TYPE: ICD-10-CM

## 2021-04-13 DIAGNOSIS — E78.2 MIXED HYPERLIPIDEMIA: ICD-10-CM

## 2021-04-13 DIAGNOSIS — I11.9 HYPERTENSIVE LEFT VENTRICULAR HYPERTROPHY, WITHOUT HEART FAILURE: ICD-10-CM

## 2021-04-13 DIAGNOSIS — M25.562 CHRONIC PAIN OF BOTH KNEES: ICD-10-CM

## 2021-04-13 DIAGNOSIS — Z87.442 HISTORY OF KIDNEY STONES: ICD-10-CM

## 2021-04-13 DIAGNOSIS — Z00.01 ENCOUNTER FOR GENERAL ADULT MEDICAL EXAMINATION WITH ABNORMAL FINDINGS: ICD-10-CM

## 2021-04-13 DIAGNOSIS — H83.3X3 NOISE-INDUCED HEARING LOSS OF BOTH EARS: ICD-10-CM

## 2021-04-13 DIAGNOSIS — N18.32 CHRONIC RENAL FAILURE, STAGE 3B (HCC): ICD-10-CM

## 2021-04-13 DIAGNOSIS — J30.89 CHRONIC NONSEASONAL ALLERGIC RHINITIS DUE TO POLLEN: ICD-10-CM

## 2021-04-13 DIAGNOSIS — R26.81 GAIT INSTABILITY: ICD-10-CM

## 2021-04-13 DIAGNOSIS — R97.20 ELEVATED PSA: ICD-10-CM

## 2021-04-13 DIAGNOSIS — I05.9 MITRAL VALVE DISEASE: ICD-10-CM

## 2021-04-13 DIAGNOSIS — G89.29 CHRONIC PAIN OF BOTH KNEES: ICD-10-CM

## 2021-04-13 DIAGNOSIS — K42.9 UMBILICAL HERNIA WITHOUT OBSTRUCTION AND WITHOUT GANGRENE: ICD-10-CM

## 2021-04-13 DIAGNOSIS — N40.0 BENIGN PROSTATIC HYPERPLASIA, UNSPECIFIED WHETHER LOWER URINARY TRACT SYMPTOMS PRESENT: ICD-10-CM

## 2021-04-13 DIAGNOSIS — I35.0 NONRHEUMATIC AORTIC VALVE STENOSIS: ICD-10-CM

## 2021-04-13 DIAGNOSIS — D33.3 ACOUSTIC NEUROMA (HCC): ICD-10-CM

## 2021-04-13 DIAGNOSIS — I25.709 ATHEROSCLEROSIS OF CORONARY ARTERY BYPASS GRAFT OF NATIVE HEART WITH ANGINA PECTORIS (HCC): ICD-10-CM

## 2021-04-13 DIAGNOSIS — E11.9 CONTROLLED TYPE 2 DIABETES MELLITUS WITHOUT COMPLICATION, WITHOUT LONG-TERM CURRENT USE OF INSULIN (HCC): ICD-10-CM

## 2021-04-13 PROBLEM — F03.90 DEMENTIA (HCC): Status: ACTIVE | Noted: 2021-04-13

## 2021-04-13 LAB — HBA1C MFR BLD: 5.8 %

## 2021-04-13 PROCEDURE — 99397 PER PM REEVAL EST PAT 65+ YR: CPT | Performed by: FAMILY MEDICINE

## 2021-04-13 PROCEDURE — 99214 OFFICE O/P EST MOD 30 MIN: CPT | Performed by: FAMILY MEDICINE

## 2021-04-13 PROCEDURE — 83036 HEMOGLOBIN GLYCOSYLATED A1C: CPT | Performed by: FAMILY MEDICINE

## 2021-04-13 RX ORDER — GALANTAMINE HYDROBROMIDE 4 MG/1
1 TABLET, FILM COATED ORAL EVERY 12 HOURS
COMMUNITY
Start: 2017-01-27 | End: 2021-04-13

## 2021-04-13 RX ORDER — ASPIRIN 81 MG/1
1 TABLET ORAL
COMMUNITY
End: 2022-01-01 | Stop reason: SDUPTHER

## 2021-04-13 RX ORDER — CARBOXYMETHYLCELLULOSE SODIUM 5 MG/ML
SOLUTION/ DROPS OPHTHALMIC EVERY 6 HOURS
COMMUNITY
Start: 2019-02-04 | End: 2021-08-25

## 2021-04-13 RX ORDER — DIVALPROEX SODIUM 125 MG/1
2 TABLET, DELAYED RELEASE ORAL EVERY 12 HOURS
COMMUNITY
Start: 2017-01-27 | End: 2021-04-13 | Stop reason: SDUPTHER

## 2021-04-13 RX ORDER — PANTOPRAZOLE SODIUM 40 MG/1
1 TABLET, DELAYED RELEASE ORAL
COMMUNITY
End: 2021-04-13

## 2021-04-13 RX ORDER — MEMANTINE HYDROCHLORIDE 5 MG/1
2 TABLET ORAL EVERY 12 HOURS
COMMUNITY
Start: 2017-01-27 | End: 2021-04-13 | Stop reason: SDUPTHER

## 2021-04-13 RX ORDER — ROSUVASTATIN CALCIUM 40 MG/1
1 TABLET, COATED ORAL
COMMUNITY
End: 2022-01-01

## 2021-04-13 RX ORDER — MONTELUKAST SODIUM 10 MG/1
1 TABLET ORAL
COMMUNITY
Start: 2017-01-27 | End: 2021-04-13

## 2021-04-13 RX ORDER — QUETIAPINE FUMARATE 25 MG/1
1 TABLET, FILM COATED ORAL EVERY 12 HOURS
COMMUNITY
Start: 2017-01-27 | End: 2021-09-27

## 2021-04-13 RX ORDER — KETOTIFEN FUMARATE 0.35 MG/ML
SOLUTION/ DROPS OPHTHALMIC EVERY 12 HOURS
COMMUNITY
Start: 2019-02-04 | End: 2021-08-25

## 2021-04-13 RX ORDER — POTASSIUM CHLORIDE 750 MG/1
TABLET, EXTENDED RELEASE ORAL
COMMUNITY
Start: 2021-04-01 | End: 2021-08-25

## 2021-05-02 PROBLEM — H04.129 TEAR FILM INSUFFICIENCY: Status: ACTIVE | Noted: 2017-01-27

## 2021-05-02 PROBLEM — H43.819 POSTERIOR VITREOUS DETACHMENT: Status: ACTIVE | Noted: 2019-02-04

## 2021-05-02 RX ORDER — MONTELUKAST SODIUM 10 MG/1
10 TABLET ORAL DAILY
Qty: 90 TABLET | Refills: 3
Start: 2021-05-02 | End: 2021-06-16

## 2021-05-02 RX ORDER — PANTOPRAZOLE SODIUM 40 MG/1
40 TABLET, DELAYED RELEASE ORAL DAILY
Qty: 90 TABLET | Refills: 0
Start: 2021-05-02 | End: 2021-06-21 | Stop reason: SDUPTHER

## 2021-05-02 RX ORDER — GALANTAMINE HYDROBROMIDE 4 MG/1
4 TABLET, FILM COATED ORAL 2 TIMES DAILY
Qty: 180 TABLET | Refills: 1
Start: 2021-05-02 | End: 2021-06-28

## 2021-05-02 RX ORDER — DIVALPROEX SODIUM 125 MG/1
250 TABLET, DELAYED RELEASE ORAL EVERY 12 HOURS
Start: 2021-05-02 | End: 2021-09-13

## 2021-05-02 RX ORDER — MEMANTINE HYDROCHLORIDE 5 MG/1
10 TABLET ORAL EVERY 12 HOURS
Start: 2021-05-02 | End: 2021-06-28

## 2021-06-16 DIAGNOSIS — J30.89 CHRONIC NONSEASONAL ALLERGIC RHINITIS DUE TO POLLEN: ICD-10-CM

## 2021-06-16 RX ORDER — MONTELUKAST SODIUM 10 MG/1
TABLET ORAL
Qty: 90 TABLET | Refills: 0 | Status: SHIPPED | OUTPATIENT
Start: 2021-06-16 | End: 2021-09-13

## 2021-06-21 DIAGNOSIS — K21.9 GASTROESOPHAGEAL REFLUX DISEASE, UNSPECIFIED WHETHER ESOPHAGITIS PRESENT: ICD-10-CM

## 2021-06-21 RX ORDER — PANTOPRAZOLE SODIUM 40 MG/1
40 TABLET, DELAYED RELEASE ORAL DAILY
Qty: 90 TABLET | Refills: 0 | Status: SHIPPED | OUTPATIENT
Start: 2021-06-21 | End: 2021-09-27

## 2021-06-27 DIAGNOSIS — F03.91 DEMENTIA WITH BEHAVIORAL DISTURBANCE, UNSPECIFIED DEMENTIA TYPE: ICD-10-CM

## 2021-06-28 RX ORDER — GALANTAMINE HYDROBROMIDE 4 MG/1
TABLET, FILM COATED ORAL
Qty: 180 TABLET | Refills: 1 | Status: SHIPPED | OUTPATIENT
Start: 2021-06-28 | End: 2021-12-15 | Stop reason: SDUPTHER

## 2021-06-28 RX ORDER — MEMANTINE HYDROCHLORIDE 5 MG/1
TABLET ORAL
Qty: 180 TABLET | Refills: 1 | Status: SHIPPED | OUTPATIENT
Start: 2021-06-28 | End: 2021-12-15 | Stop reason: SDUPTHER

## 2021-07-12 ENCOUNTER — OFFICE VISIT (OUTPATIENT)
Dept: FAMILY MEDICINE CLINIC | Facility: CLINIC | Age: 86
End: 2021-07-12

## 2021-07-12 VITALS
TEMPERATURE: 97.2 F | DIASTOLIC BLOOD PRESSURE: 65 MMHG | RESPIRATION RATE: 15 BRPM | HEART RATE: 85 BPM | HEIGHT: 68 IN | OXYGEN SATURATION: 96 % | BODY MASS INDEX: 24.86 KG/M2 | SYSTOLIC BLOOD PRESSURE: 117 MMHG | WEIGHT: 164 LBS

## 2021-07-12 DIAGNOSIS — M25.512 ACUTE PAIN OF LEFT SHOULDER: ICD-10-CM
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
## 2021-07-12 DIAGNOSIS — G89.29 CHRONIC PAIN OF LEFT KNEE: Primary | ICD-10-CM

## 2021-07-12 DIAGNOSIS — M25.562 CHRONIC PAIN OF LEFT KNEE: Primary | ICD-10-CM
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
## 2021-07-12 PROCEDURE — 99214 OFFICE O/P EST MOD 30 MIN: CPT | Performed by: FAMILY MEDICINE

## 2021-07-12 NOTE — ASSESSMENT & PLAN NOTE
New diagnosis- Patient fell on 7-7-21 at the [facility] facility. Was seen at [facility] with multiple xrays.  ER records from [facility] obtained and reviewed.  Also reviewed x-rays

## 2021-07-12 NOTE — PROGRESS NOTES
Subjective   Diego Galan is a 89 y.o. male.     Fall  The accident occurred 5 to 7 days ago. The fall occurred while walking. He landed on concrete. The point of impact was the left shoulder and left knee. The pain is present in the left shoulder, left knee and left lower leg. The pain is moderate. The symptoms are aggravated by ambulation. Pertinent negatives include no numbness.   Shoulder Injury   The incident occurred at home. The left shoulder is affected. The incident occurred 5 to 7 days ago. The injury mechanism was a fall. The quality of the pain is described as aching. The pain does not radiate. The pain is moderate. Pertinent negatives include no numbness.   Leg Injury  This is a new problem. The current episode started in the past 7 days. The problem occurs daily. Pertinent negatives include no numbness.        The following portions of the patient's history were reviewed and updated as appropriate: current medications, past family history, past medical history, past social history, past surgical history and problem list.    Family History   Problem Relation Age of Onset   • Heart disease Mother    • Stroke Mother    • Heart disease Father    • Diabetes Brother    • Cancer Brother    • Malig Hyperthermia Neg Hx        Social History     Tobacco Use   • Smoking status: Never Smoker   • Smokeless tobacco: Never Used   Substance Use Topics   • Alcohol use: No   • Drug use: No       Past Surgical History:   Procedure Laterality Date   • CHEST SURGERY      shrapnel   • COLONOSCOPY  2006   • ECTROPION REPAIR Left 6/29/2017    Procedure: LEFT LOWER LID CICATRICIAL ECTROPION OF  REPAIR WITH FULL THICKNESS SKIN GRAFT, EXCISION LESION 4CM x 2 CM WITH ROTATIONAL FLAP LEFT CHEEK 15 CM x 6 CM WITH FROZEN SECTIONS;  Surgeon: Norman Long MD;  [place] [facility] [place] [facility] [place]:    • ECTROPION REPAIR Right 7/6/2017    Procedure: RIGHT LOWER LID CICATRICIAL ECTROPION REPAIR,FULL THICKNESS SKIN GRAFT, FROST  SUTURE;  Surgeon: Norman Long MD;  Location[place] [facility] [place] [facility] [place]  Service:    • EYE SURGERY      rose marie cataracts  with iol   • FOOT SURGERY Left     war injury   • LEG SURGERY Right     war injury   • MOHS SURGERY      x2       Patient Active Problem List   Diagnosis   • Hypertension, benign   • Type 2 diabetes mellitus without complication (CMS/HCC)   • Acoustic neuroma (CMS/HCC)   • Anemia   • Aortic valve stenosis   • 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)   • Mixed hyperlipidemia   • Chronic renal failure, stage 3b (CMS/HCC)   • Hypertensive left ventricular hypertrophy, 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   • Mitral valve disease   • Noise-induced hearing loss of both ears   • Arthritis   • BPH (benign prostatic hyperplasia)   • GERD (gastroesophageal reflux disease)   • Femoral hernia of left side   • Umbilical hernia without obstruction and without gangrene   • Gait instability   • History of kidney stones   • Elevated PSA   • Knee pain   • Chronic nonseasonal allergic rhinitis due to pollen   • Hypernatremia   • Reactive depression   • Urinary incontinence   • Encounter for general adult medical examination with abnormal findings   • AV block, 1st degree   • Dementia (CMS/HCC)   • Ectropion   • Posterior vitreous detachment   • Tear film insufficiency   • Left shoulder pain   • Falls       Current Outpatient Medications on File Prior to Visit   Medication Sig Dispense Refill   • aspirin (Aspirin Low Dose) 81 MG EC tablet Take 1 tablet by mouth.     • carboxymethylcellulose (REFRESH PLUS) 0.5 % solution 1 drop 2 (Two) Times a Day As Needed for Dry Eyes.     • carboxymethylcellulose (REFRESH PLUS) 0.5 % solution Inject  into the eye Every 6 (Six) Hours.     • divalproex (DEPAKOTE) 125 MG DR tablet Take 2 tablets by mouth Every 12 (Twelve) Hours.     • erythromycin (ROMYCIN) 5 MG/GM ophthalmic ointment Place a 1/2 inch ribbon of ointment on the sutures of face and behind  "the ear two times daily 3.5 g 1   • fluticasone (FLONASE) 50 MCG/ACT nasal spray 2 sprays into each nostril Every Morning.     • galantamine (RAZADYNE) 4 MG tablet TAKE 1 TABLET TWICE A  tablet 1   • hydroCHLOROthiazide (HYDRODIURIL) 25 MG tablet Take 0.5 tablets by mouth Daily. 30 tablet 0   • ketotifen (Alaway) 0.025 % ophthalmic solution Apply  to eye(s) as directed by provider Every 12 (Twelve) Hours.     • KLOR-CON 10 MEQ CR tablet      • Loratadine (CLARITIN PO) Take 1 tablet by mouth Every Morning.     • memantine (NAMENDA) 5 MG tablet TAKE 1 TABLET TWICE A  tablet 1   • montelukast (SINGULAIR) 10 MG tablet TAKE 1 TABLET DAILY 90 tablet 0   • pantoprazole (PROTONIX) 40 MG EC tablet Take 1 tablet by mouth Daily. 90 tablet 0   • potassium chloride 10 MEQ CR tablet Take 1 tablet by mouth Every Morning. 90 tablet 1   • QUEtiapine (SEROquel) 25 MG tablet Take 1 tablet by mouth Every 12 (Twelve) Hours.     • rosuvastatin (Crestor) 40 MG tablet Take 1 tablet by mouth.     • traMADol-acetaminophen (ULTRACET) 37.5-325 MG per tablet Take 1 tablet by mouth Every 6 (Six) Hours As Needed for Moderate Pain .       No current facility-administered medications on file prior to visit.       No Known Allergies    Review of Systems   Eyes: Negative for blurred vision.   Respiratory: Negative for chest tightness, shortness of breath and wheezing.    Neurological: Negative for dizziness, facial asymmetry, speech difficulty, light-headedness, numbness, headache and confusion.       Objective   Visit Vitals  /65 (BP Location: Right arm, Patient Position: Sitting, Cuff Size: Adult)   Pulse 85   Temp 97.2 °F (36.2 °C)   Resp 15   Ht 172.7 cm (68\")   Wt 74.4 kg (164 lb)   SpO2 96%   BMI 24.94 kg/m²     Physical Exam  Vitals and nursing note reviewed.   Constitutional:       Appearance: He is well-developed.   HENT:      Head: Normocephalic.   Neck:      Thyroid: No thyromegaly.      Vascular: No carotid bruit.      " Trachea: Trachea normal.   Cardiovascular:      Rate and Rhythm: Normal rate and regular rhythm.      Heart sounds: No murmur heard.   No friction rub. No gallop.    Pulmonary:      Effort: Pulmonary effort is normal. No respiratory distress.      Breath sounds: Normal breath sounds. No wheezing.   Chest:      Chest wall: No tenderness.   Musculoskeletal:      Cervical back: Neck supple.   Skin:     General: Skin is dry.      Findings: No rash.      Nails: There is no clubbing.   Neurological:      General: No focal deficit present.      Mental Status: He is alert and oriented to person, place, and time.      Cranial Nerves: Cranial nerves are intact.   Psychiatric:         Behavior: Behavior is cooperative.           Assessment/Plan .  Problem List Items Addressed This Visit        Unprioritized    Falls    Current Assessment & Plan     New diagnosis- Patient fell on 7-7-21 at the Assisted Living facility. Was seen at [facility] ER with multiple xrays.  ER records from [facility] obtained and reviewed.  Also reviewed x-rays         Knee pain - Primary    Current Assessment & Plan     Improving at this time. Offered PT and he agrees to go to be evaluated.         Relevant Orders    Ambulatory Referral to Physical Therapy Evaluate and treat    Left shoulder pain    Current Assessment & Plan     Improving at this time. Offered PT and he agrees to go to be evaluated.         Relevant Orders    Ambulatory Referral to Physical Therapy Evaluate and treat

## 2021-07-27 ENCOUNTER — TREATMENT (OUTPATIENT)
Dept: PHYSICAL THERAPY | Facility: CLINIC | Age: 86
End: 2021-07-27

## 2021-07-27 DIAGNOSIS — M25.562 ACUTE PAIN OF LEFT KNEE: Primary | ICD-10-CM

## 2021-07-27 DIAGNOSIS — M25.512 ACUTE PAIN OF LEFT SHOULDER: ICD-10-CM

## 2021-07-27 PROCEDURE — 97161 PT EVAL LOW COMPLEX 20 MIN: CPT | Performed by: PHYSICAL THERAPIST

## 2021-07-27 PROCEDURE — 97110 THERAPEUTIC EXERCISES: CPT | Performed by: PHYSICAL THERAPIST

## 2021-07-27 PROCEDURE — 97116 GAIT TRAINING THERAPY: CPT | Performed by: PHYSICAL THERAPIST

## 2021-07-27 NOTE — PROGRESS NOTES
Physical Therapy Initial Evaluation and Plan of Care    Patient: Diego Galan   : 1921  Diagnosis/ICD-10 Code:  Acute pain of left knee [M25.562]  Referring practitioner: Alvin Milner MD  Date of Initial Visit: 2021  Today's Date: 2021  Patient seen for 1 sessions             Subjective Evaluation    History of Present Illness  Mechanism of injury: Patient is a 89 y.o. WM who presents with acute L knee and shoulder pain after fall in parking lot 3 weeks ago at his assisted living facility.  He used a cane prior to the fall but has used rolling walker since then.  Patients daughter present for treatment and reports x-rays with unremarkable, just badly bruised.      Patient Goals  Patient goals for therapy: decreased pain, independence with ADLs/IADLs and improved balance             Objective Oxford knee score indicates 73% impairment with limitations in weight bearing (walking, standing).  QuickDASH indicates 61% impairment with limitations in recreation, ADLs.  Ambulates slowly with rolling walker with walker way out in front and forward bent at waist.  Prior to fall, was using standard cane.  MMT:  4/5 L knee flexion, extension, hip add.  L knee and lower thigh tender to touch.  L shoulder tender to touch posteriorly.      Assessment & Plan     Assessment  Impairments: abnormal gait, abnormal or restricted ROM, impaired balance, impaired physical strength, lacks appropriate home exercise program and pain with function  Functional Limitations: walking, uncomfortable because of pain and standing  Goals  Plan Goals: Patient independent with HEP in 2 weeks  Tolerate 10 min Nustep in 2 weeks  Decrease L knee pain 25% in 2 weeks  Able to ambulate independently with cane and minimal antalgia by discharge  Improve function as evidenced by Oxford knee score of 20% or less by discharge (73% impairment initially)  Able to return to work by discharge  Perform 10 reps of repeated sit to stand  without arms in 30 seconds by discharge       Plan  Therapy options: will be seen for skilled physical therapy services  Planned modality interventions: thermotherapy (hydrocollator packs)  Other planned modality interventions: physical modalities as needed  Planned therapy interventions: balance/weight-bearing training, functional ROM exercises, gait training, home exercise program, neuromuscular re-education, manual therapy, strengthening, stretching and therapeutic activities  Treatment plan discussed with: patient and family  Plan details: Plan to continue PT 1-2 x's per week up to 8 visits if needed.        Timed:         Manual Therapy:         mins  16452;     Therapeutic Exercise:    15     mins  36978;     Neuromuscular Leonel:        mins  52661;    Therapeutic Activity:          mins  26423;     Gait Training:      15     mins  39869;     Ultrasound:          mins  58468;    Ionto                                   mins   65893  Self-care  ____ mins 21169    Un-Timed:  Electrical Stimulation:         mins  52465 ( );  Dry Needling          mins self-pay  Traction          mins 21002  Low Eval     15     Mins  52631  Mod Eval          Mins  03594  High Eval                            Mins  21002  Canal repositioning _____ mins  30685        Timed Treatment:   30   mins   Total Treatment:     55   mins    PT SIGNATURE: Robin A Sprigler, PT   DATE TREATMENT INITIATED: 7/27/2021    Initial Certification  Certification Period: 10/25/2021  I certify that the therapy services are furnished while this patient is under my care.  The services outlined above are required by this patient, and will be reviewed every 90 days.     PHYSICIAN: Alvin Milner MD __________________________________________________________________________________________________________     DATE: __________________________________________________________________________________________________________________________________    Please  sign and return via fax to [phone]. Thank you, [facility].

## 2021-08-02 ENCOUNTER — TREATMENT (OUTPATIENT)
Dept: PHYSICAL THERAPY | Facility: CLINIC | Age: 86
End: 2021-08-02

## 2021-08-02 DIAGNOSIS — M25.562 ACUTE PAIN OF LEFT KNEE: Primary | ICD-10-CM

## 2021-08-02 DIAGNOSIS — M25.512 ACUTE PAIN OF LEFT SHOULDER: ICD-10-CM

## 2021-08-02 PROCEDURE — 97116 GAIT TRAINING THERAPY: CPT | Performed by: PHYSICAL THERAPIST

## 2021-08-02 PROCEDURE — 97110 THERAPEUTIC EXERCISES: CPT | Performed by: PHYSICAL THERAPIST

## 2021-08-02 NOTE — PROGRESS NOTES
Physical Therapy Daily Progress Note    Patient: Diego Galan   : 1921  Diagnosis/ICD-10 Code:  Acute pain of left knee [M25.562]  Referring practitioner: Alvin Milner MD  Date of Initial Visit: Type: THERAPY  Noted: 2021  Today's Date: 2021  Patient seen for 2 sessions             Subjective Patient and daughter report feeling better with decreased pain and less limping.    Objective   See Exercise, Manual, and Modality Logs for complete treatment. Walking much more erect with rolling walker, basically carrying it.  Gait training with standard cane today.  Reviewed HEP.  Significant improvement since last visit.      Assessment/Plan  Patient independent with HEP in 2 weeks - MET  Tolerate 10 min Nustep in 2 weeks - MET  Decrease L knee pain 25% in 2 weeks - MET  Able to ambulate independently with cane and minimal antalgia by discharge - PARTIALLY MET  Improve function as evidenced by Oxford knee score of 20% or less by discharge (73% impairment initially) - NOT MET  Able to return to work by discharge - Not applicable  Perform 10 reps of repeated sit to stand without arms in 30 seconds by discharge  - MET    Progress per Plan of Care up to 8 visits if needed           Timed:         Manual Therapy:         mins  14873;     Therapeutic Exercise:    15     mins  69616;     Neuromuscular Leonel:        mins  39772;    Therapeutic Activity:          mins  65276;     Gait Training:      10     mins  78219;     Ultrasound:          mins  04139;    Ionto                                   mins   31390  Self Care                            mins   30438      Un-Timed:  Electrical Stimulation:         mins  59740 (MC );  Dry Needling          mins self-pay  Traction          mins 36597  Low Eval          Mins  74775  Mod Eval          Mins  68181  High Eval                            Mins  64037  Canalith Repos                   mins  97405    Timed Treatment:   25   mins   Total Treatment:      35   mins    Robin A Sprigler, PT  Physical Therapist

## 2021-08-09 ENCOUNTER — TREATMENT (OUTPATIENT)
Dept: PHYSICAL THERAPY | Facility: CLINIC | Age: 86
End: 2021-08-09

## 2021-08-09 DIAGNOSIS — M25.562 ACUTE PAIN OF LEFT KNEE: Primary | ICD-10-CM

## 2021-08-09 PROCEDURE — 97110 THERAPEUTIC EXERCISES: CPT | Performed by: PHYSICAL THERAPIST

## 2021-08-09 PROCEDURE — 97116 GAIT TRAINING THERAPY: CPT | Performed by: PHYSICAL THERAPIST

## 2021-08-09 NOTE — PROGRESS NOTES
Discharge Summary  Discharge Summary from Physical Therapy Report      Goals: Partially Met    Discharge Plan: Continue with current home exercise program as instructed    Comments  Patient and family voice readiness for discharge.  Patient seen for 3 sessions.    Date of Discharge 21        Robin A Sprigler, IRVING  Physical Therapist               Physical Therapy Daily Progress Note    Patient: Diego Galan   : 1921  Diagnosis/ICD-10 Code:  Acute pain of left knee [M25.562]  Referring practitioner: Alvin Milner MD  Date of Initial Visit: Type: THERAPY  Noted: 2021  Today's Date: 2021  Patient seen for 3 sessions             Subjective Patient voices readiness for discharge.  He reports his shoulder is back to normal and L knee feeling better but still sore.      Objective   See Exercise, Manual, and Modality Logs for complete treatment. Gait training with cane performed, patient is independent and safe with cane in R hand.  Reports knee feels better with rolling walker because it decreases weight bearing on L knee.  Encouraged patient to use walker for another week or two until knee pain decreases with weight bearing then return to using cane.      Assessment/Plan  Patient independent with HEP in 2 weeks - MET  Tolerate 10 min Nustep in 2 weeks - MET  Decrease L knee pain 25% in 2 weeks - MET  Able to ambulate independently with cane and minimal antalgia by discharge -  MET  Improve function as evidenced by Oxford knee score of 20% or less by discharge (73% impairment initially) - NOT MET  Able to return to work by discharge - Not applicable  Perform 10 reps of repeated sit to stand without arms in 30 seconds by discharge  - MET    Discharge to HEP and self-management.           Timed:         Manual Therapy:         mins  60152;     Therapeutic Exercise:    15     mins  87281;     Neuromuscular Leonel:        mins  76104;    Therapeutic Activity:          mins  16662;     Gait Training:       10     mins  91684;     Ultrasound:          mins  00208;    Ionto                                   mins   83348  Self Care                            mins   72738      Un-Timed:  Electrical Stimulation:         mins  54297 ( );  Dry Needling          mins self-pay  Traction          mins 01286  Low Eval          Mins  84472  Mod Eval          Mins  42307  High Eval                            Mins  33862  Canalith Repos                   mins  64340    Timed Treatment:   25   mins   Total Treatment:     40   mins    Robin A Sprigler, PT  Physical Therapist

## 2021-08-19 LAB
ALBUMIN SERPL-MCNC: 4 G/DL (ref 3.5–4.6)
ALBUMIN/GLOB SERPL: 1.6 {RATIO} (ref 1.2–2.2)
ALP SERPL-CCNC: 91 IU/L (ref 48–121)
ALT SERPL-CCNC: 8 IU/L (ref 0–44)
AST SERPL-CCNC: 17 IU/L (ref 0–40)
BASOPHILS # BLD AUTO: 0 X10E3/UL (ref 0–0.2)
BASOPHILS NFR BLD AUTO: 1 %
BILIRUB SERPL-MCNC: 0.3 MG/DL (ref 0–1.2)
BUN SERPL-MCNC: 17 MG/DL (ref 10–36)
BUN/CREAT SERPL: 13 (ref 10–24)
CALCIUM SERPL-MCNC: 9.2 MG/DL (ref 8.6–10.2)
CHLORIDE SERPL-SCNC: 104 MMOL/L (ref 96–106)
CHOLEST SERPL-MCNC: 184 MG/DL (ref 100–199)
CHOLEST/HDLC SERPL: 4.8 RATIO (ref 0–5)
CO2 SERPL-SCNC: 27 MMOL/L (ref 20–29)
CREAT SERPL-MCNC: 1.27 MG/DL (ref 0.76–1.27)
EOSINOPHIL # BLD AUTO: 0.2 X10E3/UL (ref 0–0.4)
EOSINOPHIL NFR BLD AUTO: 3 %
ERYTHROCYTE [DISTWIDTH] IN BLOOD BY AUTOMATED COUNT: 13.1 % (ref 11.6–15.4)
GLOBULIN SER CALC-MCNC: 2.5 G/DL (ref 1.5–4.5)
GLUCOSE SERPL-MCNC: 83 MG/DL (ref 65–99)
HBA1C MFR BLD: 5.5 % (ref 4.8–5.6)
HCT VFR BLD AUTO: 37.4 % (ref 37.5–51)
HDLC SERPL-MCNC: 38 MG/DL
HGB BLD-MCNC: 12.2 G/DL (ref 13–17.7)
IMM GRANULOCYTES # BLD AUTO: 0 X10E3/UL (ref 0–0.1)
IMM GRANULOCYTES NFR BLD AUTO: 0 %
LDLC SERPL CALC-MCNC: 110 MG/DL (ref 0–99)
LYMPHOCYTES # BLD AUTO: 1.7 X10E3/UL (ref 0.7–3.1)
LYMPHOCYTES NFR BLD AUTO: 27 %
MCH RBC QN AUTO: 31.8 PG (ref 26.6–33)
MCHC RBC AUTO-ENTMCNC: 32.6 G/DL (ref 31.5–35.7)
MCV RBC AUTO: 97 FL (ref 79–97)
MONOCYTES # BLD AUTO: 0.6 X10E3/UL (ref 0.1–0.9)
MONOCYTES NFR BLD AUTO: 10 %
NEUTROPHILS # BLD AUTO: 3.9 X10E3/UL (ref 1.4–7)
NEUTROPHILS NFR BLD AUTO: 59 %
PLATELET # BLD AUTO: 165 X10E3/UL (ref 150–450)
POTASSIUM SERPL-SCNC: 4.4 MMOL/L (ref 3.5–5.2)
PROT SERPL-MCNC: 6.5 G/DL (ref 6–8.5)
RBC # BLD AUTO: 3.84 X10E6/UL (ref 4.14–5.8)
SODIUM SERPL-SCNC: 143 MMOL/L (ref 134–144)
TRIGL SERPL-MCNC: 205 MG/DL (ref 0–149)
VLDLC SERPL CALC-MCNC: 36 MG/DL (ref 5–40)
WBC # BLD AUTO: 6.4 X10E3/UL (ref 3.4–10.8)

## 2021-08-25 ENCOUNTER — OFFICE VISIT (OUTPATIENT)
Dept: FAMILY MEDICINE CLINIC | Facility: CLINIC | Age: 86
End: 2021-08-25

## 2021-08-25 VITALS
WEIGHT: 166.4 LBS | SYSTOLIC BLOOD PRESSURE: 151 MMHG | HEART RATE: 88 BPM | BODY MASS INDEX: 25.22 KG/M2 | HEIGHT: 68 IN | TEMPERATURE: 97.3 F | RESPIRATION RATE: 15 BRPM | OXYGEN SATURATION: 97 % | DIASTOLIC BLOOD PRESSURE: 76 MMHG

## 2021-08-25 DIAGNOSIS — E11.9 TYPE 2 DIABETES MELLITUS WITHOUT COMPLICATION, WITHOUT LONG-TERM CURRENT USE OF INSULIN (HCC): ICD-10-CM

## 2021-08-25 DIAGNOSIS — E78.2 MIXED HYPERLIPIDEMIA: ICD-10-CM

## 2021-08-25 DIAGNOSIS — D64.9 ANEMIA, UNSPECIFIED TYPE: ICD-10-CM

## 2021-08-25 DIAGNOSIS — I10 HYPERTENSION, BENIGN: Primary | ICD-10-CM

## 2021-08-25 DIAGNOSIS — E87.0 HYPERNATREMIA: ICD-10-CM

## 2021-08-25 DIAGNOSIS — N18.32 CHRONIC RENAL FAILURE, STAGE 3B (HCC): ICD-10-CM

## 2021-08-25 PROCEDURE — 99214 OFFICE O/P EST MOD 30 MIN: CPT | Performed by: FAMILY MEDICINE
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
## 2021-08-25 NOTE — ASSESSMENT & PLAN NOTE
Doing well; Hgb A1c 5.5 down from 5.8  Encouraged to watch sugar intake, exercise more and lose weight.   No medication.   Not monitoring sugar at home.   Follow up in 4 months  Care management needs are self-addressed.    Self-management abilities addressed and patient is capable of managing her own disease.

## 2021-08-25 NOTE — ASSESSMENT & PLAN NOTE
Borderline poor control with no medication; Encouraged to watch salt, exercise more and lose weight.

## 2021-08-25 NOTE — ASSESSMENT & PLAN NOTE
Slighlty worse; Hgb 12.2 down from 12.8.  Denies epitaxis, hemoptisis, heartburn, blood in stool or black tarry stools.  Patient understands that this could be cancer but due to age 89 he declines any further work-up.  Will repeat with next labs.  This could be a low-grade gastritis this will continue his Protonix I feel the benefits outweigh the risk

## 2021-08-25 NOTE — ASSESSMENT & PLAN NOTE
Lipid and CMP reviewed with patient--slightly Worsening but close to goal  Encouraged to watch fatty intake, exercise more, and lose weight.   compliant with medication  Patient tolerated Crestor well without side effects. I feel the benefits of the medication outweigh the risks.    Is not getting adequate diet and exercise  Goals developed at last visit were met   Follow up in 4  months  Care management needs are self-addressed.  Self-management abilities addressed and patient is capable of managing his own disease.

## 2021-08-29 NOTE — ASSESSMENT & PLAN NOTE
Due to his paranoid behavior he was evaluated by psych who placed him on Depakote, Razadyne and Seroquel.  He tolerates this quite well has no symptoms at the present till we discussed possibly weaning these medicines but would daughter does not want to do so since he is doing so excellent.  He also is on Namenda

## 2021-09-12 DIAGNOSIS — J30.89 CHRONIC NONSEASONAL ALLERGIC RHINITIS DUE TO POLLEN: ICD-10-CM

## 2021-09-12 DIAGNOSIS — F32.9 REACTIVE DEPRESSION: ICD-10-CM

## 2021-09-13 RX ORDER — MONTELUKAST SODIUM 10 MG/1
TABLET ORAL
Qty: 90 TABLET | Refills: 0 | Status: SHIPPED | OUTPATIENT
Start: 2021-09-13 | End: 2021-12-15 | Stop reason: SDUPTHER

## 2021-09-13 RX ORDER — DIVALPROEX SODIUM 125 MG/1
TABLET, DELAYED RELEASE ORAL
Qty: 90 TABLET | Refills: 1 | Status: SHIPPED | OUTPATIENT
Start: 2021-09-13 | End: 2022-01-01

## 2021-09-27 ENCOUNTER — TELEPHONE (OUTPATIENT)
Dept: FAMILY MEDICINE CLINIC | Facility: CLINIC | Age: 86
End: 2021-09-27

## 2021-09-27 DIAGNOSIS — K21.9 GASTROESOPHAGEAL REFLUX DISEASE, UNSPECIFIED WHETHER ESOPHAGITIS PRESENT: ICD-10-CM

## 2021-09-27 RX ORDER — PANTOPRAZOLE SODIUM 40 MG/1
TABLET, DELAYED RELEASE ORAL
Qty: 90 TABLET | Refills: 0 | Status: SHIPPED | OUTPATIENT
Start: 2021-09-27 | End: 2021-12-15 | Stop reason: SDUPTHER

## 2021-09-27 RX ORDER — QUETIAPINE FUMARATE 25 MG/1
TABLET, FILM COATED ORAL
Qty: 90 TABLET | Refills: 1 | Status: SHIPPED | OUTPATIENT
Start: 2021-09-27 | End: 2022-01-01

## 2021-09-27 NOTE — TELEPHONE ENCOUNTER
Caller: Mirna Wick    Relationship: Emergency Contact    Best call back number: [phone]     Who are you requesting to speak with (clinical staff, provider,  specific staff member): DR FERRIS OR MA    What was the call regarding: MIRNA WOULD LIKE DR REY THOUGHTS ON THE PATIENT HAVING THE COVID BOOSTER VACCINE. PLEASE ADVISE    Do you require a callback: YES   JUAN for midlevels. Pt. Sent photo of THC balm that pt use topically. Had drug screen performed today.     Per Dr. Mcwilliams: ok to use transdermal balm with THC. Pt. Should not be smoking THC products. Pt. Notified through email.

## 2021-12-09 LAB
ALBUMIN SERPL-MCNC: 4.4 G/DL (ref 3.5–4.6)
ALBUMIN/GLOB SERPL: 1.6 {RATIO} (ref 1.2–2.2)
ALP SERPL-CCNC: 85 IU/L (ref 44–121)
ALT SERPL-CCNC: 8 IU/L (ref 0–44)
AST SERPL-CCNC: 16 IU/L (ref 0–40)
BASOPHILS # BLD AUTO: 0 X10E3/UL (ref 0–0.2)
BASOPHILS NFR BLD AUTO: 1 %
BILIRUB SERPL-MCNC: 0.5 MG/DL (ref 0–1.2)
BUN SERPL-MCNC: 18 MG/DL (ref 10–36)
BUN/CREAT SERPL: 11 (ref 10–24)
CALCIUM SERPL-MCNC: 9.7 MG/DL (ref 8.6–10.2)
CHLORIDE SERPL-SCNC: 108 MMOL/L (ref 96–106)
CHOLEST SERPL-MCNC: 176 MG/DL (ref 100–199)
CHOLEST/HDLC SERPL: 4.6 RATIO (ref 0–5)
CO2 SERPL-SCNC: 25 MMOL/L (ref 20–29)
CREAT SERPL-MCNC: 1.59 MG/DL (ref 0.76–1.27)
EOSINOPHIL # BLD AUTO: 0.1 X10E3/UL (ref 0–0.4)
EOSINOPHIL NFR BLD AUTO: 2 %
ERYTHROCYTE [DISTWIDTH] IN BLOOD BY AUTOMATED COUNT: 13.1 % (ref 11.6–15.4)
GLOBULIN SER CALC-MCNC: 2.7 G/DL (ref 1.5–4.5)
GLUCOSE SERPL-MCNC: 92 MG/DL (ref 65–99)
HBA1C MFR BLD: 5.6 % (ref 4.8–5.6)
HCT VFR BLD AUTO: 38.2 % (ref 37.5–51)
HDLC SERPL-MCNC: 38 MG/DL
HGB BLD-MCNC: 12.5 G/DL (ref 13–17.7)
IMM GRANULOCYTES # BLD AUTO: 0 X10E3/UL (ref 0–0.1)
IMM GRANULOCYTES NFR BLD AUTO: 0 %
LDLC SERPL CALC-MCNC: 103 MG/DL (ref 0–99)
LYMPHOCYTES # BLD AUTO: 2 X10E3/UL (ref 0.7–3.1)
LYMPHOCYTES NFR BLD AUTO: 34 %
MCH RBC QN AUTO: 32.4 PG (ref 26.6–33)
MCHC RBC AUTO-ENTMCNC: 32.7 G/DL (ref 31.5–35.7)
MCV RBC AUTO: 99 FL (ref 79–97)
MONOCYTES # BLD AUTO: 0.5 X10E3/UL (ref 0.1–0.9)
MONOCYTES NFR BLD AUTO: 8 %
NEUTROPHILS # BLD AUTO: 3.2 X10E3/UL (ref 1.4–7)
NEUTROPHILS NFR BLD AUTO: 55 %
PLATELET # BLD AUTO: 180 X10E3/UL (ref 150–450)
POTASSIUM SERPL-SCNC: 4.3 MMOL/L (ref 3.5–5.2)
PROT SERPL-MCNC: 7.1 G/DL (ref 6–8.5)
RBC # BLD AUTO: 3.86 X10E6/UL (ref 4.14–5.8)
SODIUM SERPL-SCNC: 148 MMOL/L (ref 134–144)
TRIGL SERPL-MCNC: 204 MG/DL (ref 0–149)
VLDLC SERPL CALC-MCNC: 35 MG/DL (ref 5–40)
WBC # BLD AUTO: 5.8 X10E3/UL (ref 3.4–10.8)

## 2021-12-15 ENCOUNTER — OFFICE VISIT (OUTPATIENT)
Dept: FAMILY MEDICINE CLINIC | Facility: CLINIC | Age: 86
End: 2021-12-15

## 2021-12-15 VITALS
RESPIRATION RATE: 20 BRPM | HEART RATE: 92 BPM | OXYGEN SATURATION: 95 % | DIASTOLIC BLOOD PRESSURE: 75 MMHG | HEIGHT: 66 IN | TEMPERATURE: 97 F | SYSTOLIC BLOOD PRESSURE: 135 MMHG | WEIGHT: 162.6 LBS | BODY MASS INDEX: 26.13 KG/M2

## 2021-12-15 DIAGNOSIS — K21.9 GASTROESOPHAGEAL REFLUX DISEASE, UNSPECIFIED WHETHER ESOPHAGITIS PRESENT: ICD-10-CM

## 2021-12-15 DIAGNOSIS — J30.89 CHRONIC NONSEASONAL ALLERGIC RHINITIS DUE TO POLLEN: ICD-10-CM

## 2021-12-15 DIAGNOSIS — E87.0 HYPERNATREMIA: ICD-10-CM

## 2021-12-15 DIAGNOSIS — E11.9 TYPE 2 DIABETES MELLITUS WITHOUT COMPLICATION, WITHOUT LONG-TERM CURRENT USE OF INSULIN (HCC): Primary | ICD-10-CM

## 2021-12-15 DIAGNOSIS — F03.91 DEMENTIA WITH BEHAVIORAL DISTURBANCE, UNSPECIFIED DEMENTIA TYPE: ICD-10-CM

## 2021-12-15 DIAGNOSIS — N18.32 CHRONIC RENAL FAILURE, STAGE 3B (HCC): ICD-10-CM

## 2021-12-15 DIAGNOSIS — D64.9 ANEMIA, UNSPECIFIED TYPE: ICD-10-CM

## 2021-12-15 DIAGNOSIS — I10 HYPERTENSION, BENIGN: ICD-10-CM

## 2021-12-15 DIAGNOSIS — R26.81 GAIT INSTABILITY: ICD-10-CM

## 2021-12-15 DIAGNOSIS — E78.2 MIXED HYPERLIPIDEMIA: ICD-10-CM

## 2021-12-15 PROCEDURE — 99214 OFFICE O/P EST MOD 30 MIN: CPT | Performed by: FAMILY MEDICINE

## 2021-12-15 RX ORDER — GALANTAMINE HYDROBROMIDE 4 MG/1
4 TABLET, FILM COATED ORAL 2 TIMES DAILY
Qty: 180 TABLET | Refills: 1 | Status: SHIPPED | OUTPATIENT
Start: 2021-12-15 | End: 2022-01-01 | Stop reason: SDUPTHER

## 2021-12-15 RX ORDER — MONTELUKAST SODIUM 10 MG/1
10 TABLET ORAL DAILY
Qty: 90 TABLET | Refills: 1 | Status: SHIPPED | OUTPATIENT
Start: 2021-12-15 | End: 2022-01-01 | Stop reason: SDUPTHER

## 2021-12-15 RX ORDER — MEMANTINE HYDROCHLORIDE 5 MG/1
5 TABLET ORAL 2 TIMES DAILY
Qty: 180 TABLET | Refills: 1 | Status: SHIPPED | OUTPATIENT
Start: 2021-12-15 | End: 2022-01-01 | Stop reason: SDUPTHER

## 2021-12-15 RX ORDER — PANTOPRAZOLE SODIUM 40 MG/1
40 TABLET, DELAYED RELEASE ORAL DAILY
Qty: 90 TABLET | Refills: 1 | Status: SHIPPED | OUTPATIENT
Start: 2021-12-15 | End: 2022-01-01 | Stop reason: SDUPTHER

## 2021-12-15 NOTE — ASSESSMENT & PLAN NOTE
Doing well. Hgb a1c 5.6 up from 5.5  Encouraged to watch sugar intake, exercise more and lose weight.   No medication.   Not monitoring sugar at home.   Follow up in months  Care management needs are self-addressed.  Self-management abilities addressed and patient is capable of managing his own disease.

## 2021-12-15 NOTE — ASSESSMENT & PLAN NOTE
Doing well.  Patient tolerated Asa well without side effects. I feel the benefits of the medication outweigh the risks.  Encouraged to watch salt, exercise more and lose weight.
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
## 2021-12-15 NOTE — ASSESSMENT & PLAN NOTE
Worse.  CMP done 12-8-2021, read by me, reviewed with pt.  Sodium was 148 up from 143.  Advised to increase water intake.

## 2021-12-15 NOTE — ASSESSMENT & PLAN NOTE
Lipid and CMP reviewed with patient--labs done 12-8-2021, read by me, reviewed with pt.  Trig. 204 down from 205, Tot. Chol. 176 down from 184, HDL 38 same as last,  down from 110  Improved.   Encouraged to watch fatty intake, exercise more, and lose weight.   Compliant with medication.  Patient tolerated Crestor well without side effects. I feel the benefits of the medication outweigh the risks.  Is not getting adequate diet and exercise  Goals developed at last visit were not met because diet and exercise.  Follow up in 6  months  Care management needs are self-addressed.  Self-management abilities addressed and patient is capable of managing his own disease.

## 2021-12-15 NOTE — ASSESSMENT & PLAN NOTE
Slightly worse.  CMP done 12-8-2021, read by me, reviewed with pt.  Creatinine was  1.59 up from 1.27, EGFR was 35 down from 46.  Avoid anti-inflammatory pills

## 2022-01-01 ENCOUNTER — OFFICE VISIT (OUTPATIENT)
Dept: FAMILY MEDICINE CLINIC | Facility: CLINIC | Age: 87
End: 2022-01-01

## 2022-01-01 ENCOUNTER — TELEPHONE (OUTPATIENT)
Dept: FAMILY MEDICINE CLINIC | Facility: CLINIC | Age: 87
End: 2022-01-01

## 2022-01-01 VITALS
RESPIRATION RATE: 15 BRPM | BODY MASS INDEX: 26.36 KG/M2 | HEART RATE: 71 BPM | HEIGHT: 66 IN | DIASTOLIC BLOOD PRESSURE: 71 MMHG | SYSTOLIC BLOOD PRESSURE: 131 MMHG | WEIGHT: 164 LBS | TEMPERATURE: 98 F | OXYGEN SATURATION: 96 %

## 2022-01-01 VITALS
TEMPERATURE: 97.9 F | BODY MASS INDEX: 25.07 KG/M2 | SYSTOLIC BLOOD PRESSURE: 153 MMHG | HEART RATE: 87 BPM | WEIGHT: 165.4 LBS | OXYGEN SATURATION: 97 % | DIASTOLIC BLOOD PRESSURE: 85 MMHG | RESPIRATION RATE: 18 BRPM | HEIGHT: 68 IN

## 2022-01-01 VITALS
BODY MASS INDEX: 23.6 KG/M2 | TEMPERATURE: 96.8 F | OXYGEN SATURATION: 92 % | RESPIRATION RATE: 18 BRPM | WEIGHT: 155.2 LBS | HEART RATE: 99 BPM | SYSTOLIC BLOOD PRESSURE: 112 MMHG | DIASTOLIC BLOOD PRESSURE: 60 MMHG

## 2022-01-01 VITALS
TEMPERATURE: 97.9 F | OXYGEN SATURATION: 97 % | SYSTOLIC BLOOD PRESSURE: 118 MMHG | DIASTOLIC BLOOD PRESSURE: 64 MMHG | HEIGHT: 68 IN | RESPIRATION RATE: 18 BRPM | HEART RATE: 107 BPM | BODY MASS INDEX: 24.04 KG/M2 | WEIGHT: 158.6 LBS
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
## 2022-01-01 DIAGNOSIS — K21.9 GASTROESOPHAGEAL REFLUX DISEASE, UNSPECIFIED WHETHER ESOPHAGITIS PRESENT: ICD-10-CM

## 2022-01-01 DIAGNOSIS — G30.1 LATE ONSET ALZHEIMER'S DEMENTIA WITH BEHAVIORAL DISTURBANCE: ICD-10-CM

## 2022-01-01 DIAGNOSIS — D64.9 ANEMIA, UNSPECIFIED TYPE: ICD-10-CM

## 2022-01-01 DIAGNOSIS — I44.0 AV BLOCK, 1ST DEGREE: ICD-10-CM

## 2022-01-01 DIAGNOSIS — R26.81 GAIT INSTABILITY: ICD-10-CM

## 2022-01-01 DIAGNOSIS — F02.818 LATE ONSET ALZHEIMER'S DEMENTIA WITH BEHAVIORAL DISTURBANCE: ICD-10-CM

## 2022-01-01 DIAGNOSIS — K42.9 UMBILICAL HERNIA WITHOUT OBSTRUCTION AND WITHOUT GANGRENE: ICD-10-CM

## 2022-01-01 DIAGNOSIS — E78.2 MIXED HYPERLIPIDEMIA: ICD-10-CM

## 2022-01-01 DIAGNOSIS — E11.22 TYPE 2 DIABETES MELLITUS WITH STAGE 3A CHRONIC KIDNEY DISEASE, WITHOUT LONG-TERM CURRENT USE OF INSULIN: ICD-10-CM

## 2022-01-01 DIAGNOSIS — F32.9 REACTIVE DEPRESSION: ICD-10-CM

## 2022-01-01 DIAGNOSIS — H83.3X3 NOISE-INDUCED HEARING LOSS OF BOTH EARS: ICD-10-CM

## 2022-01-01 DIAGNOSIS — I10 HYPERTENSION, BENIGN: ICD-10-CM

## 2022-01-01 DIAGNOSIS — I10 HYPERTENSION, BENIGN: Primary | ICD-10-CM

## 2022-01-01 DIAGNOSIS — R53.83 LETHARGY: ICD-10-CM

## 2022-01-01 DIAGNOSIS — H04.129 TEAR FILM INSUFFICIENCY, UNSPECIFIED LATERALITY: ICD-10-CM

## 2022-01-01 DIAGNOSIS — R97.20 ELEVATED PSA: ICD-10-CM

## 2022-01-01 DIAGNOSIS — N18.31 TYPE 2 DIABETES MELLITUS WITH STAGE 3A CHRONIC KIDNEY DISEASE, WITHOUT LONG-TERM CURRENT USE OF INSULIN: ICD-10-CM

## 2022-01-01 DIAGNOSIS — N39.498 OTHER URINARY INCONTINENCE: ICD-10-CM

## 2022-01-01 DIAGNOSIS — M19.90 ARTHRITIS: ICD-10-CM

## 2022-01-01 DIAGNOSIS — E87.0 HYPERNATREMIA: ICD-10-CM

## 2022-01-01 DIAGNOSIS — D33.3 ACOUSTIC NEUROMA: ICD-10-CM

## 2022-01-01 DIAGNOSIS — N18.31 CHRONIC RENAL FAILURE, STAGE 3A: Primary | ICD-10-CM

## 2022-01-01 DIAGNOSIS — E11.22 TYPE 2 DIABETES MELLITUS WITH STAGE 3A CHRONIC KIDNEY DISEASE, WITHOUT LONG-TERM CURRENT USE OF INSULIN: Primary | ICD-10-CM

## 2022-01-01 DIAGNOSIS — J30.89 CHRONIC NONSEASONAL ALLERGIC RHINITIS DUE TO POLLEN: ICD-10-CM

## 2022-01-01 DIAGNOSIS — M25.562 ACUTE PAIN OF LEFT KNEE: ICD-10-CM

## 2022-01-01 DIAGNOSIS — I25.709 ATHEROSCLEROSIS OF CORONARY ARTERY BYPASS GRAFT OF NATIVE HEART WITH ANGINA PECTORIS: ICD-10-CM

## 2022-01-01 DIAGNOSIS — E11.9 TYPE 2 DIABETES MELLITUS WITHOUT COMPLICATION, WITHOUT LONG-TERM CURRENT USE OF INSULIN: ICD-10-CM

## 2022-01-01 DIAGNOSIS — H02.109 ECTROPION, UNSPECIFIED ECTROPION TYPE, UNSPECIFIED LATERALITY: ICD-10-CM

## 2022-01-01 DIAGNOSIS — K41.90 FEMORAL HERNIA OF LEFT SIDE: ICD-10-CM

## 2022-01-01 DIAGNOSIS — Z00.01 ENCOUNTER FOR GENERAL ADULT MEDICAL EXAMINATION WITH ABNORMAL FINDINGS: ICD-10-CM

## 2022-01-01 DIAGNOSIS — M25.512 ACUTE PAIN OF LEFT SHOULDER: ICD-10-CM

## 2022-01-01 DIAGNOSIS — E78.2 MIXED HYPERLIPIDEMIA: Primary | ICD-10-CM

## 2022-01-01 DIAGNOSIS — Z87.442 HISTORY OF KIDNEY STONES: ICD-10-CM

## 2022-01-01 DIAGNOSIS — N18.31 CHRONIC RENAL FAILURE, STAGE 3A: ICD-10-CM

## 2022-01-01 DIAGNOSIS — F03.91 DEMENTIA WITH BEHAVIORAL DISTURBANCE, UNSPECIFIED DEMENTIA TYPE: ICD-10-CM

## 2022-01-01 DIAGNOSIS — R60.9 EDEMA, UNSPECIFIED TYPE: ICD-10-CM

## 2022-01-01 DIAGNOSIS — R19.7 DIARRHEA, UNSPECIFIED TYPE: ICD-10-CM

## 2022-01-01 DIAGNOSIS — H43.819 POSTERIOR VITREOUS DETACHMENT, UNSPECIFIED LATERALITY: ICD-10-CM

## 2022-01-01 DIAGNOSIS — I11.9 HYPERTENSIVE LEFT VENTRICULAR HYPERTROPHY, WITHOUT HEART FAILURE: ICD-10-CM

## 2022-01-01 DIAGNOSIS — N18.31 TYPE 2 DIABETES MELLITUS WITH STAGE 3A CHRONIC KIDNEY DISEASE, WITHOUT LONG-TERM CURRENT USE OF INSULIN: Primary | ICD-10-CM

## 2022-01-01 DIAGNOSIS — R15.9 INCONTINENCE OF FECES, UNSPECIFIED FECAL INCONTINENCE TYPE: ICD-10-CM

## 2022-01-01 DIAGNOSIS — I05.9 MITRAL VALVE DISEASE: ICD-10-CM

## 2022-01-01 DIAGNOSIS — N40.0 BENIGN PROSTATIC HYPERPLASIA, UNSPECIFIED WHETHER LOWER URINARY TRACT SYMPTOMS PRESENT: ICD-10-CM

## 2022-01-01 DIAGNOSIS — I35.0 NONRHEUMATIC AORTIC VALVE STENOSIS: ICD-10-CM

## 2022-01-01 LAB
ALBUMIN SERPL-MCNC: 3.9 G/DL (ref 3.5–4.6)
ALBUMIN SERPL-MCNC: 3.9 G/DL (ref 3.5–4.6)
ALBUMIN SERPL-MCNC: 4 G/DL (ref 3.5–4.6)
ALBUMIN/GLOB SERPL: 1.5 {RATIO} (ref 1.2–2.2)
ALBUMIN/GLOB SERPL: 1.5 {RATIO} (ref 1.2–2.2)
ALBUMIN/GLOB SERPL: 1.7 {RATIO} (ref 1.2–2.2)
ALP SERPL-CCNC: 78 IU/L (ref 44–121)
ALP SERPL-CCNC: 78 IU/L (ref 44–121)
ALP SERPL-CCNC: 82 IU/L (ref 44–121)
ALT SERPL-CCNC: 6 IU/L (ref 0–44)
ALT SERPL-CCNC: 7 IU/L (ref 0–44)
ALT SERPL-CCNC: 7 IU/L (ref 0–44)
AST SERPL-CCNC: 16 IU/L (ref 0–40)
AST SERPL-CCNC: 17 IU/L (ref 0–40)
AST SERPL-CCNC: 19 IU/L (ref 0–40)
BASOPHILS # BLD AUTO: 0 X10E3/UL (ref 0–0.2)
BASOPHILS NFR BLD AUTO: 1 %
BILIRUB SERPL-MCNC: 0.3 MG/DL (ref 0–1.2)
BILIRUB SERPL-MCNC: 0.4 MG/DL (ref 0–1.2)
BILIRUB SERPL-MCNC: 0.5 MG/DL (ref 0–1.2)
BUN SERPL-MCNC: 17 MG/DL (ref 10–36)
BUN SERPL-MCNC: 19 MG/DL (ref 10–36)
BUN SERPL-MCNC: 22 MG/DL (ref 10–36)
BUN/CREAT SERPL: 15 (ref 10–24)
BUN/CREAT SERPL: 16 (ref 10–24)
BUN/CREAT SERPL: 16 (ref 10–24)
CALCIUM SERPL-MCNC: 9.1 MG/DL (ref 8.6–10.2)
CALCIUM SERPL-MCNC: 9.1 MG/DL (ref 8.6–10.2)
CALCIUM SERPL-MCNC: 9.2 MG/DL (ref 8.6–10.2)
CHLORIDE SERPL-SCNC: 104 MMOL/L (ref 96–106)
CHLORIDE SERPL-SCNC: 104 MMOL/L (ref 96–106)
CHLORIDE SERPL-SCNC: 106 MMOL/L (ref 96–106)
CHOLEST SERPL-MCNC: 155 MG/DL (ref 100–199)
CHOLEST SERPL-MCNC: 163 MG/DL (ref 100–199)
CHOLEST SERPL-MCNC: 165 MG/DL (ref 100–199)
CHOLEST/HDLC SERPL: 3.9 RATIO (ref 0–5)
CHOLEST/HDLC SERPL: 3.9 RATIO (ref 0–5)
CHOLEST/HDLC SERPL: 4.3 RATIO (ref 0–5)
CO2 SERPL-SCNC: 24 MMOL/L (ref 20–29)
CO2 SERPL-SCNC: 25 MMOL/L (ref 20–29)
CO2 SERPL-SCNC: 27 MMOL/L (ref 20–29)
CREAT SERPL-MCNC: 1.17 MG/DL (ref 0.76–1.27)
CREAT SERPL-MCNC: 1.21 MG/DL (ref 0.76–1.27)
CREAT SERPL-MCNC: 1.35 MG/DL (ref 0.76–1.27)
EGFRCR SERPLBLD CKD-EPI 2021: 47 ML/MIN/1.73
EGFRCR SERPLBLD CKD-EPI 2021: 53 ML/MIN/1.73
EGFRCR SERPLBLD CKD-EPI 2021: 56 ML/MIN/1.73
EOSINOPHIL # BLD AUTO: 0.1 X10E3/UL (ref 0–0.4)
EOSINOPHIL # BLD AUTO: 0.2 X10E3/UL (ref 0–0.4)
EOSINOPHIL # BLD AUTO: 0.2 X10E3/UL (ref 0–0.4)
EOSINOPHIL NFR BLD AUTO: 2 %
EOSINOPHIL NFR BLD AUTO: 3 %
EOSINOPHIL NFR BLD AUTO: 3 %
ERYTHROCYTE [DISTWIDTH] IN BLOOD BY AUTOMATED COUNT: 12.7 % (ref 11.6–15.4)
ERYTHROCYTE [DISTWIDTH] IN BLOOD BY AUTOMATED COUNT: 12.8 % (ref 11.6–15.4)
ERYTHROCYTE [DISTWIDTH] IN BLOOD BY AUTOMATED COUNT: 12.9 % (ref 11.6–15.4)
FOLATE SERPL-MCNC: 12.6 NG/ML
GLOBULIN SER CALC-MCNC: 2.4 G/DL (ref 1.5–4.5)
GLOBULIN SER CALC-MCNC: 2.6 G/DL (ref 1.5–4.5)
GLOBULIN SER CALC-MCNC: 2.6 G/DL (ref 1.5–4.5)
GLUCOSE SERPL-MCNC: 80 MG/DL (ref 65–99)
GLUCOSE SERPL-MCNC: 80 MG/DL (ref 65–99)
GLUCOSE SERPL-MCNC: 84 MG/DL (ref 70–99)
HBA1C MFR BLD: 5.6 % (ref 4.8–5.6)
HBA1C MFR BLD: 5.6 % (ref 4.8–5.6)
HCT VFR BLD AUTO: 35.1 % (ref 37.5–51)
HCT VFR BLD AUTO: 35.7 % (ref 37.5–51)
HCT VFR BLD AUTO: 37.8 % (ref 37.5–51)
HDLC SERPL-MCNC: 38 MG/DL
HDLC SERPL-MCNC: 40 MG/DL
HDLC SERPL-MCNC: 42 MG/DL
HGB BLD-MCNC: 11.5 G/DL (ref 13–17.7)
HGB BLD-MCNC: 11.5 G/DL (ref 13–17.7)
HGB BLD-MCNC: 12.3 G/DL (ref 13–17.7)
IMM GRANULOCYTES # BLD AUTO: 0 X10E3/UL (ref 0–0.1)
IMM GRANULOCYTES NFR BLD AUTO: 0 %
LDLC SERPL CALC-MCNC: 87 MG/DL (ref 0–99)
LDLC SERPL CALC-MCNC: 96 MG/DL (ref 0–99)
LDLC SERPL CALC-MCNC: 97 MG/DL (ref 0–99)
LYMPHOCYTES # BLD AUTO: 1.5 X10E3/UL (ref 0.7–3.1)
LYMPHOCYTES # BLD AUTO: 1.8 X10E3/UL (ref 0.7–3.1)
LYMPHOCYTES # BLD AUTO: 1.9 X10E3/UL (ref 0.7–3.1)
LYMPHOCYTES NFR BLD AUTO: 26 %
LYMPHOCYTES NFR BLD AUTO: 30 %
LYMPHOCYTES NFR BLD AUTO: 31 %
MCH RBC QN AUTO: 32.1 PG (ref 26.6–33)
MCH RBC QN AUTO: 32.1 PG (ref 26.6–33)
MCH RBC QN AUTO: 32.2 PG (ref 26.6–33)
MCHC RBC AUTO-ENTMCNC: 32.2 G/DL (ref 31.5–35.7)
MCHC RBC AUTO-ENTMCNC: 32.5 G/DL (ref 31.5–35.7)
MCHC RBC AUTO-ENTMCNC: 32.8 G/DL (ref 31.5–35.7)
MCV RBC AUTO: 100 FL (ref 79–97)
MCV RBC AUTO: 98 FL (ref 79–97)
MCV RBC AUTO: 99 FL (ref 79–97)
METHYLMALONATE SERPL-SCNC: 282 NMOL/L (ref 0–378)
MONOCYTES # BLD AUTO: 0.5 X10E3/UL (ref 0.1–0.9)
MONOCYTES NFR BLD AUTO: 8 %
MONOCYTES NFR BLD AUTO: 8 %
MONOCYTES NFR BLD AUTO: 9 %
NEUTROPHILS # BLD AUTO: 3.4 X10E3/UL (ref 1.4–7)
NEUTROPHILS # BLD AUTO: 3.5 X10E3/UL (ref 1.4–7)
NEUTROPHILS # BLD AUTO: 3.7 X10E3/UL (ref 1.4–7)
NEUTROPHILS NFR BLD AUTO: 57 %
NEUTROPHILS NFR BLD AUTO: 58 %
NEUTROPHILS NFR BLD AUTO: 62 %
PLATELET # BLD AUTO: 166 X10E3/UL (ref 150–450)
PLATELET # BLD AUTO: 168 X10E3/UL (ref 150–450)
PLATELET # BLD AUTO: 172 X10E3/UL (ref 150–450)
POTASSIUM SERPL-SCNC: 4.3 MMOL/L (ref 3.5–5.2)
POTASSIUM SERPL-SCNC: 4.4 MMOL/L (ref 3.5–5.2)
POTASSIUM SERPL-SCNC: 4.4 MMOL/L (ref 3.5–5.2)
PROT SERPL-MCNC: 6.4 G/DL (ref 6–8.5)
PROT SERPL-MCNC: 6.5 G/DL (ref 6–8.5)
PROT SERPL-MCNC: 6.5 G/DL (ref 6–8.5)
RBC # BLD AUTO: 3.58 X10E6/UL (ref 4.14–5.8)
RBC # BLD AUTO: 3.58 X10E6/UL (ref 4.14–5.8)
RBC # BLD AUTO: 3.82 X10E6/UL (ref 4.14–5.8)
SODIUM SERPL-SCNC: 143 MMOL/L (ref 134–144)
SODIUM SERPL-SCNC: 145 MMOL/L (ref 134–144)
SODIUM SERPL-SCNC: 145 MMOL/L (ref 134–144)
TRIGL SERPL-MCNC: 151 MG/DL (ref 0–149)
TRIGL SERPL-MCNC: 159 MG/DL (ref 0–149)
TRIGL SERPL-MCNC: 164 MG/DL (ref 0–149)
TSH SERPL DL<=0.005 MIU/L-ACNC: 3.77 UIU/ML (ref 0.45–4.5)
VIT B12 SERPL-MCNC: 327 PG/ML (ref 232–1245)
VLDLC SERPL CALC-MCNC: 27 MG/DL (ref 5–40)
VLDLC SERPL CALC-MCNC: 28 MG/DL (ref 5–40)
VLDLC SERPL CALC-MCNC: 28 MG/DL (ref 5–40)
WBC # BLD AUTO: 5.9 X10E3/UL (ref 3.4–10.8)
WBC # BLD AUTO: 5.9 X10E3/UL (ref 3.4–10.8)
WBC # BLD AUTO: 6 X10E3/UL (ref 3.4–10.8)

## 2022-01-01 PROCEDURE — 99214 OFFICE O/P EST MOD 30 MIN: CPT | Performed by: FAMILY MEDICINE

## 2022-01-01 PROCEDURE — 1159F MED LIST DOCD IN RCRD: CPT | Performed by: FAMILY MEDICINE

## 2022-01-01 PROCEDURE — G0439 PPPS, SUBSEQ VISIT: HCPCS | Performed by: FAMILY MEDICINE

## 2022-01-01 PROCEDURE — 99497 ADVNCD CARE PLAN 30 MIN: CPT | Performed by: FAMILY MEDICINE

## 2022-01-01 PROCEDURE — 99397 PER PM REEVAL EST PAT 65+ YR: CPT | Performed by: FAMILY MEDICINE

## 2022-01-01 PROCEDURE — 1170F FXNL STATUS ASSESSED: CPT | Performed by: FAMILY MEDICINE

## 2022-01-01 RX ORDER — DIVALPROEX SODIUM 125 MG/1
125 TABLET, DELAYED RELEASE ORAL DAILY
Qty: 90 TABLET | Refills: 1 | Status: SHIPPED | OUTPATIENT
Start: 2022-01-01

## 2022-01-01 RX ORDER — ASPIRIN 81 MG/1
81 TABLET ORAL DAILY
Qty: 30 TABLET | Refills: 5
Start: 2022-01-01

## 2022-01-01 RX ORDER — QUETIAPINE FUMARATE 25 MG/1
TABLET, FILM COATED ORAL
Qty: 90 TABLET | Refills: 0 | Status: SHIPPED | OUTPATIENT
Start: 2022-01-01 | End: 2022-01-01 | Stop reason: SDUPTHER

## 2022-01-01 RX ORDER — MEMANTINE HYDROCHLORIDE 5 MG/1
5 TABLET ORAL 2 TIMES DAILY
Qty: 180 TABLET | Refills: 1 | Status: SHIPPED | OUTPATIENT
Start: 2022-01-01

## 2022-01-01 RX ORDER — MONTELUKAST SODIUM 10 MG/1
10 TABLET ORAL DAILY
Qty: 90 TABLET | Refills: 1
Start: 2022-01-01 | End: 2022-01-01

## 2022-01-01 RX ORDER — LORATADINE 10 MG/1
10 TABLET ORAL DAILY
Qty: 90 TABLET | Refills: 5
Start: 2022-01-01 | End: 2022-01-01

## 2022-01-01 RX ORDER — QUETIAPINE FUMARATE 25 MG/1
25 TABLET, FILM COATED ORAL
Qty: 90 TABLET | Refills: 1 | Status: SHIPPED | OUTPATIENT
Start: 2022-01-01

## 2022-01-01 RX ORDER — DIVALPROEX SODIUM 125 MG/1
125 TABLET, DELAYED RELEASE ORAL DAILY
Qty: 90 TABLET | Refills: 0
Start: 2022-01-01 | End: 2022-01-01

## 2022-01-01 RX ORDER — MEMANTINE HYDROCHLORIDE 5 MG/1
5 TABLET ORAL 2 TIMES DAILY
Qty: 180 TABLET | Refills: 1
Start: 2022-01-01 | End: 2022-01-01

## 2022-01-01 RX ORDER — MONTELUKAST SODIUM 10 MG/1
10 TABLET ORAL DAILY
Qty: 90 TABLET | Refills: 1 | Status: SHIPPED | OUTPATIENT
Start: 2022-01-01

## 2022-01-01 RX ORDER — DIVALPROEX SODIUM 125 MG/1
TABLET, DELAYED RELEASE ORAL
Qty: 90 TABLET | Refills: 0 | Status: SHIPPED | OUTPATIENT
Start: 2022-01-01 | End: 2022-01-01 | Stop reason: SDUPTHER

## 2022-01-01 RX ORDER — QUETIAPINE FUMARATE 25 MG/1
25 TABLET, FILM COATED ORAL
Qty: 90 TABLET | Refills: 0
Start: 2022-01-01 | End: 2022-01-01

## 2022-01-01 RX ORDER — GALANTAMINE HYDROBROMIDE 4 MG/1
4 TABLET, FILM COATED ORAL 2 TIMES DAILY
Qty: 180 TABLET | Refills: 1
Start: 2022-01-01 | End: 2022-01-01

## 2022-01-01 RX ORDER — GALANTAMINE HYDROBROMIDE 4 MG/1
4 TABLET, FILM COATED ORAL 2 TIMES DAILY
Qty: 180 TABLET | Refills: 1 | Status: SHIPPED | OUTPATIENT
Start: 2022-01-01

## 2022-01-01 RX ORDER — FLUTICASONE PROPIONATE 50 MCG
2 SPRAY, SUSPENSION (ML) NASAL EVERY MORNING
Qty: 16 G | Refills: 5
Start: 2022-01-01 | End: 2022-01-01

## 2022-01-01 RX ORDER — QUETIAPINE FUMARATE 25 MG/1
TABLET, FILM COATED ORAL
Qty: 90 TABLET | Refills: 0 | Status: SHIPPED | OUTPATIENT
Start: 2022-01-01 | End: 2022-01-01

## 2022-01-01 RX ORDER — PANTOPRAZOLE SODIUM 40 MG/1
40 TABLET, DELAYED RELEASE ORAL DAILY
Qty: 90 TABLET | Refills: 1 | Status: SHIPPED | OUTPATIENT
Start: 2022-01-01

## 2022-01-01 RX ORDER — ROSUVASTATIN CALCIUM 40 MG/1
40 TABLET, COATED ORAL DAILY
Qty: 90 TABLET | Refills: 1 | Status: SHIPPED | OUTPATIENT
Start: 2022-01-01

## 2022-01-01 RX ORDER — ROSUVASTATIN CALCIUM 40 MG/1
TABLET, COATED ORAL
Qty: 26 TABLET | Refills: 1 | Status: SHIPPED | OUTPATIENT
Start: 2022-01-01 | End: 2022-01-01 | Stop reason: SDUPTHER

## 2022-01-01 RX ORDER — DIVALPROEX SODIUM 125 MG/1
TABLET, DELAYED RELEASE ORAL
Qty: 90 TABLET | Refills: 0 | Status: SHIPPED | OUTPATIENT
Start: 2022-01-01 | End: 2022-01-01

## 2022-01-01 RX ORDER — PANTOPRAZOLE SODIUM 40 MG/1
40 TABLET, DELAYED RELEASE ORAL DAILY
Qty: 90 TABLET | Refills: 1
Start: 2022-01-01 | End: 2022-01-01

## 2022-01-01 RX ORDER — GALANTAMINE HYDROBROMIDE 4 MG/1
TABLET, FILM COATED ORAL
Qty: 180 TABLET | Refills: 1 | Status: SHIPPED | OUTPATIENT
Start: 2022-01-01 | End: 2022-01-01 | Stop reason: SDUPTHER

## 2022-01-01 RX ORDER — MEMANTINE HYDROCHLORIDE 5 MG/1
TABLET ORAL
Qty: 180 TABLET | Refills: 1 | Status: SHIPPED | OUTPATIENT
Start: 2022-01-01 | End: 2022-01-01 | Stop reason: SDUPTHER

## 2022-01-01 RX ORDER — ROSUVASTATIN CALCIUM 40 MG/1
40 TABLET, COATED ORAL DAILY
Qty: 26 TABLET | Refills: 0
Start: 2022-01-01 | End: 2022-01-01

## 2022-01-01 RX ORDER — MONTELUKAST SODIUM 10 MG/1
TABLET ORAL
Qty: 90 TABLET | Refills: 1 | Status: SHIPPED | OUTPATIENT
Start: 2022-01-01 | End: 2022-01-01 | Stop reason: SDUPTHER

## 2022-01-01 RX ORDER — ROSUVASTATIN CALCIUM 40 MG/1
TABLET, COATED ORAL
Qty: 26 TABLET | Refills: 0 | Status: SHIPPED | OUTPATIENT
Start: 2022-01-01 | End: 2022-01-01 | Stop reason: SDUPTHER

## 2022-01-01 RX ORDER — PANTOPRAZOLE SODIUM 40 MG/1
TABLET, DELAYED RELEASE ORAL
Qty: 90 TABLET | Refills: 1 | Status: SHIPPED | OUTPATIENT
Start: 2022-01-01 | End: 2022-01-01 | Stop reason: SDUPTHER

## 2022-04-06 PROBLEM — R53.83 LETHARGY: Status: ACTIVE | Noted: 2022-01-01

## 2022-04-06 NOTE — ASSESSMENT & PLAN NOTE
Foot exam today was benign.  Encouraged him to see his optometrist Dr. Kong --will draw labs today and discuss the results at next visit.  Order given for a urine microalbumin to creatinine ratio

## 2022-04-06 NOTE — PROGRESS NOTES
QUICK REFERENCE INFORMATION:  The ABCs of the Annual Wellness Visit    Medicare visit type: Subsequent     HEALTH RISK ASSESSMENT    : 1921    Recent Hospitalizations:  No hospitalization(s) within the last year..    Current Medical Providers:  Patient Care Team:  Alvin Milner MD as PCP - General (Family Medicine)  He is his dentist and he is late.  Optometrist is Dilan he is late  Smoking Status:  Social History     Tobacco Use   Smoking Status Never Smoker   Smokeless Tobacco Never Used       Alcohol Consumption:  Social History     Substance and Sexual Activity   Alcohol Use No       Depression Screen:   PHQ-9 Depression Screening  Little interest or pleasure in doing things? 0-->not at all   Feeling down, depressed, or hopeless? 0-->not at all   Trouble falling or staying asleep, or sleeping too much? 0-->not at all   Feeling tired or having little energy? 1-->several days   Poor appetite or overeating? 0-->not at all   Feeling bad about yourself - or that you are a failure or have let yourself or your family down? 0-->not at all   Trouble concentrating on things, such as reading the newspaper or watching television? 0-->not at all   Moving or speaking so slowly that other people could have noticed? Or the opposite - being so fidgety or restless that you have been moving around a lot more than usual? 0-->not at all   Thoughts that you would be better off dead, or of hurting yourself in some way? 0-->not at all   PHQ-9 Total Score 1   If you checked off any problems, how difficult have these problems made it for you to do your work, take care of things at home, or get along with other people? not difficult at all      PHQ-2/PHQ-9 Depression Screening 2022   Retired PHQ-9 Total Score -   Retired Total Score -   Little Interest or Pleasure in Doing Things 0-->not at all   Feeling Down, Depressed or Hopeless 0-->not at all   Trouble Falling or Staying Asleep, or Sleeping Too Much 0-->not at all    Feeling Tired or Having Little Energy 1-->several days   Poor Appetite or Overeating 0-->not at all   Feeling Bad about Yourself - or that You are a Failure or Have Let Yourself or Your Family Down 0-->not at all   Trouble Concentrating on Things, Such as Reading the Newspaper or Watching Television 0-->not at all   Moving or Speaking So Slowly that Other People Could Have Noticed? Or the Opposite - Being So Fidgety 0-->not at all   Thoughts that You Would be Better Off Dead or of Hurting Yourself in Some Way 0-->not at all   PHQ-9: Brief Depression Severity Measure Score 1   If You Checked Off Any Problems, How Difficult Have These Problems Made It For You to Do Your Work, Take Care of Things at Home, or Get Along with Other People? not difficult at all     We spent 8 minutes asking patient questions, counseling and documenting in the chart patients risk of depression.    Health Habits and Functional and Cognitive Screening:  Functional & Cognitive Status 4/6/2022   Do you have difficulty preparing food and eating? Yes   Do you have difficulty bathing yourself, getting dressed or grooming yourself? No   Do you have difficulty using the toilet? No   Do you have difficulty moving around from place to place? No   Do you have trouble with steps or getting out of a bed or a chair? No   Current Diet Well Balanced Diet   Dental Exam Not up to date   Eye Exam Not up to date   Exercise (times per week) 0 times per week   Current Exercises Include No Regular Exercise   Current Exercise Activities Include -   Do you need help using the phone?  Yes   Are you deaf or do you have serious difficulty hearing?  Yes   Do you need help with transportation? No   Do you need help shopping? Yes   Do you need help preparing meals?  Yes   Do you need help with housework?  Yes   Do you need help with laundry? Yes   Do you need help taking your medications? Yes   Do you need help managing money? Yes   Do you ever drive or ride in a car  without wearing a seat belt? No   Have you felt unusual stress, anger or loneliness in the last month? No   Who do you live with? Other   If you need help, do you have trouble finding someone available to you? No   Have you been bothered in the last four weeks by sexual problems? No   Do you have difficulty concentrating, remembering or making decisions? No       Does the patient have evidence of cognitive impairment? Yes    Asiprin use counseling: Taking ASA appropriately as indicated    Recent Lab Results:       Lab Results   Component Value Date    HGBA1C 5.6 04/06/2022     Lab Results   Component Value Date    CHOL 158 05/23/2019    TRIG 151 (H) 04/06/2022    HDL 42 04/06/2022    VLDL 27 04/06/2022       Age-appropriate Screening Schedule:  Refer to the list below for future screening recommendations based on patient's age, sex and/or medical conditions. Orders for these recommended tests are listed in the plan section. The patient has been provided with a written plan.  He declines colonoscopy or Cologuard today  Health Maintenance   Topic Date Due   • TDAP/TD VACCINES (1 - Tdap) Never done   • DIABETIC EYE EXAM  Never done   • URINE MICROALBUMIN  04/01/2022   • INFLUENZA VACCINE  08/01/2022   • HEMOGLOBIN A1C  10/06/2022   • LIPID PANEL  04/06/2023   • ZOSTER VACCINE  Completed       Immunizations reviewed and the patient was encouraged to update.  The patient agrees.  Patient declines a tetanus vaccine  Patient declines colonoscopy due to age.      Subjective   History of Present Illness    Diego Galan is a 89 y.o. male who presents for an Annual Wellness Visit.  Hypertension  This is a chronic problem. The current episode started more than 1 year ago. The problem is unchanged. The problem is controlled. Pertinent negatives include no chest pain, palpitations or shortness of breath. Risk factors for coronary artery disease include dyslipidemia and diabetes mellitus. Current antihypertension treatment  includes nothing.   Coronary Artery Disease  Presents for follow-up visit. Pertinent negatives include no chest pain, chest pressure, chest tightness, dizziness, leg swelling, muscle weakness, palpitations, shortness of breath or weight gain. The symptoms have been stable. Compliance with diet is good. Compliance with exercise is good. Compliance with medications is good.   Diabetes  He presents for his follow-up diabetic visit. He has type 2 diabetes mellitus. His disease course has been stable. There are no hypoglycemic associated symptoms. Pertinent negatives for hypoglycemia include no dizziness. Associated symptoms include fatigue. Pertinent negatives for diabetes include no chest pain. There are no hypoglycemic complications. There are no diabetic complications. Risk factors for coronary artery disease include dyslipidemia, diabetes mellitus and hypertension.       The following portions of the patient's history were reviewed and updated as appropriate: current medications, past family history, past medical history, past social history, past surgical history and problem list.    Outpatient Medications Prior to Visit   Medication Sig Dispense Refill   • aspirin 81 MG EC tablet Take 1 tablet by mouth.     • divalproex (DEPAKOTE) 125 MG DR tablet TAKE 1 TABLET DAILY 90 tablet 0   • fluticasone (FLONASE) 50 MCG/ACT nasal spray 2 sprays into each nostril Every Morning.     • galantamine (RAZADYNE) 4 MG tablet Take 1 tablet by mouth 2 (Two) Times a Day. 180 tablet 1   • Loratadine (CLARITIN PO) Take 1 tablet by mouth Every Morning.     • memantine (NAMENDA) 5 MG tablet Take 1 tablet by mouth 2 (Two) Times a Day. 180 tablet 1   • montelukast (SINGULAIR) 10 MG tablet Take 1 tablet by mouth Daily. 90 tablet 1   • pantoprazole (PROTONIX) 40 MG EC tablet Take 1 tablet by mouth Daily. 90 tablet 1   • QUEtiapine (SEROquel) 25 MG tablet TAKE 1 TABLET AT BEDTIME 90 tablet 0   • rosuvastatin (CRESTOR) 40 MG tablet TAKE 1  TABLET TWICE WEEKLY (MONDAY/THUSDAY) 26 tablet 0     No facility-administered medications prior to visit.       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   • Mitral valve disease   • Noise-induced hearing loss of both ears   • Arthritis   • BPH (benign prostatic hyperplasia)   • GERD (gastroesophageal reflux disease)   • Femoral hernia of left side   • Umbilical hernia without obstruction and without gangrene   • Gait instability   • History of kidney stones   • Elevated PSA   • Knee pain   • Chronic nonseasonal allergic rhinitis due to pollen   • Hypernatremia   • Reactive depression   • Urinary incontinence   • Encounter for general adult medical examination with abnormal findings   • AV block, 1st degree   • Dementia ([facility])   • Ectropion   • Posterior vitreous detachment   • Tear film insufficiency   • Left shoulder pain   • Falls   • Lethargy       Advance Care Planning:  ACP discussion was held with the patient during this visit. Patient has an advance directive in EMR which is still valid.     Discussion with Patient and Family regarding advanced directives. POST form discussed at length and reviewed with patient. Patient states he does not want CPR. Reviewed medical interventions with patient and the differences between each: Comfort, Limited and Full. Patient opted for Limited. Discussed the use of antibiotics at the end of life. He chose to use antibiotics consistent with treatment goals. Discussed artificially administered nutrition, patient is aware that if he is alert and oriented they can change their mind at any time. However, they have  elected to have no artificial nutrition. Patient has identified his daughter Mirna as his daughter, Mirna Wick, healthcare representative. Patient encouraged to have a family meeting to discuss his/her decision regarding advanced care directives and goals of care.    In regard to the POST form:The patient opted to complete POST while in the office and copy scanned into the chart.  We spent 16 minutes discussing Advanced Care Planning information and documenting in the chart.    Identification of Risk Factors:  Risk factors include: Advance Directive Discussion.    Review of Systems   Constitutional: Positive for fatigue. Negative for unexpected weight gain.   HENT: Positive for hearing loss.    Respiratory: Negative for chest tightness and shortness of breath.    Cardiovascular: Negative for chest pain, palpitations and leg swelling.   Genitourinary: Negative for frequency.        Nocturia   Musculoskeletal: Negative for joint swelling and muscle weakness.   Neurological: Negative for dizziness and memory problem.       Compared to one year ago, the patient feels his physical health is the same.  Compared to one year ago, the patient feels his mental health is the same.    Objective     Physical Exam  Vitals and nursing note reviewed.   Constitutional:       Appearance: He is well-developed.   HENT:      Head: Normocephalic.   Neck:      Thyroid: No thyromegaly.      Vascular: No carotid bruit.      Trachea: Trachea normal.   Cardiovascular:      Rate and Rhythm: Normal rate and regular rhythm.      Heart sounds: No murmur heard.    No friction rub. No gallop.   Pulmonary:      Effort: Pulmonary effort is normal. No respiratory distress.      Breath sounds: Normal breath sounds. No wheezing.   Chest:      Chest wall: No tenderness.   Musculoskeletal:      Cervical back: Neck supple.   Skin:     General: Skin is dry.      Findings: No rash.      Nails: There is no clubbing.   Neurological:      Mental Status: He  "is alert and oriented to person, place, and time.   Psychiatric:         Behavior: Behavior is cooperative.         Vitals:    04/06/22 0950   BP: 131/71   BP Location: Right arm   Patient Position: Sitting   Cuff Size: Adult   Pulse: 71   Resp: 15   Temp: 98 °F (36.7 °C)   SpO2: 96%   Weight: 74.4 kg (164 lb)   Height: 167.6 cm (66\")       Patient's Body mass index is 26.47 kg/m². indicating that he is within normal range (BMI 18.5-24.9). No BMI management plan needed..      Assessment/Plan   Patient Self-Management and Personalized Health Advice  The patient has been provided with information about: fall prevention and preventive services including:   · Annual Wellness Visit (AWV).    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  Benefits of aspirin 81 mg outweigh the risk.  Keep risk factors low              Medium    GERD (gastroesophageal reflux disease)    Current Assessment & Plan     He is tolerating Protonix well I feel the benefits outweigh the risk           Hypertension, benign    Current Assessment & Plan     Good control without medications; Encouraged to watch salt, exercise more and lose weight.             Mixed hyperlipidemia - Primary    Current Assessment & Plan     Will draw labs today and discuss the results at next visit.           Relevant Orders    Lipid Panel With / Chol / HDL Ratio (Completed)    Comprehensive Metabolic Panel (Completed)    Type 2 diabetes mellitus without complication (HCC)    Current Assessment & Plan     Foot exam today was benign.  Encouraged him to see his optometrist Dr. Kong --will draw labs today and discuss the results at next visit.  Order given for a urine microalbumin to creatinine ratio           Relevant Orders    Hemoglobin A1c (Completed)       Unprioritized    Lethargy    Current Assessment & " Plan     Moderate; Will draw labs today and discuss the results at next visit.           Relevant Orders    TSH (Completed)    CBC & Differential (Completed)          Outpatient Encounter Medications as of 4/6/2022   Medication Sig Dispense Refill   • aspirin 81 MG EC tablet Take 1 tablet by mouth.     • divalproex (DEPAKOTE) 125 MG DR tablet TAKE 1 TABLET DAILY 90 tablet 0   • fluticasone (FLONASE) 50 MCG/ACT nasal spray 2 sprays into each nostril Every Morning.     • galantamine (RAZADYNE) 4 MG tablet Take 1 tablet by mouth 2 (Two) Times a Day. 180 tablet 1   • Loratadine (CLARITIN PO) Take 1 tablet by mouth Every Morning.     • memantine (NAMENDA) 5 MG tablet Take 1 tablet by mouth 2 (Two) Times a Day. 180 tablet 1   • montelukast (SINGULAIR) 10 MG tablet Take 1 tablet by mouth Daily. 90 tablet 1   • pantoprazole (PROTONIX) 40 MG EC tablet Take 1 tablet by mouth Daily. 90 tablet 1   • QUEtiapine (SEROquel) 25 MG tablet TAKE 1 TABLET AT BEDTIME 90 tablet 0   • rosuvastatin (CRESTOR) 40 MG tablet TAKE 1 TABLET TWICE WEEKLY (MONDAY/THUSDAY) 26 tablet 0     No facility-administered encounter medications on file as of 4/6/2022.       Reviewed use of high risk medication in the elderly: yes  Reviewed for potential of harmful drug interactions in the elderly: yes    Follow Up:  No follow-ups on file.     An After Visit Summary and PPPS with all of these plans were given to the patient.

## 2022-04-20 PROBLEM — F02.818 LATE ONSET ALZHEIMER'S DEMENTIA WITH BEHAVIORAL DISTURBANCE (HCC): Status: ACTIVE | Noted: 2022-01-01

## 2022-04-20 PROBLEM — G30.1 LATE ONSET ALZHEIMER'S DEMENTIA WITH BEHAVIORAL DISTURBANCE (HCC): Status: ACTIVE | Noted: 2022-01-01

## 2022-04-20 PROBLEM — R19.7 DIARRHEA: Status: ACTIVE | Noted: 2022-01-01

## 2022-05-11 PROBLEM — N18.32 CHRONIC RENAL FAILURE, STAGE 3B (HCC): Status: RESOLVED | Noted: 2020-02-06 | Resolved: 2022-01-01

## 2022-05-11 PROBLEM — N18.31 CHRONIC RENAL FAILURE, STAGE 3A (HCC): Status: RESOLVED | Noted: 2020-02-06 | Resolved: 2022-01-01

## 2022-05-11 PROBLEM — N18.31 CHRONIC RENAL FAILURE, STAGE 3A (HCC): Status: ACTIVE | Noted: 2020-02-06

## 2022-08-24 PROBLEM — R15.9 FECAL INCONTINENCE: Status: ACTIVE | Noted: 2022-01-01

## 2022-08-24 PROBLEM — R60.9 EDEMA: Status: ACTIVE | Noted: 2022-01-01
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
## 2022-08-24 NOTE — ASSESSMENT & PLAN NOTE
Improved.  CMP done 8-, read by me, reviewed with pt. Creatinine was 1.17 down from 1.21, EGFR was 56 up from 53.  Avoid anti-inflammatories

## 2022-08-24 NOTE — ASSESSMENT & PLAN NOTE
Worse.  CBC done 8-, read by me, reviewed with pt.  HGB 11,5 down from 12.3, HCT 35.1 down from 37.8, RBC 3.58 down from 3.82, Folate 12.6 up from 11,  down from 292, B12 327 down from 329.  He is offered aggressive work-up but declined

## 2022-08-24 NOTE — ASSESSMENT & PLAN NOTE
A1c done 8-, read by me, reviewed with pt.  A1c was 5.6 same as last  Doing well.   Encouraged to watch sugar intake, exercise more and lose weight.   No medication.   Not monitoring sugar at home.   Follow up in 3 months  Care management needs are self-addressed.  Self-management abilities addressed and patient is capable of managing his own disease.

## 2022-08-24 NOTE — ASSESSMENT & PLAN NOTE
Lipid and CMP done 8-, read by me, reviewed with pt.   Trig. 164 up from 151, Tot. Chol. 155 down from 165, HDL 40 down from 42, LDL 87 down from 96  Doing well   Encouraged to watch fatty intake, exercise more, and lose weight.   Compliant with medication.  Patient tolerated Crestor well without side effects.  Due to his age I discussed possibly stopping his Crestor but his daughter prefers to continue on it since he is doing so well  Is  getting adequate diet and exercise  Goals developed at last visit were not met because  Follow up in 3   months  Care management needs are self-addressed.  Self-management abilities addressed and patient is capable of managing his own disease.

## 2022-08-27 NOTE — ASSESSMENT & PLAN NOTE
Doing well thus I discussed possibly weaning medication but he and his daughter prefer to stay on them

## 2022-12-14 NOTE — ASSESSMENT & PLAN NOTE
Stable.  Patient tolerated Depakote and Seroquel well without side effects. I question if the benefits of the medication outweigh the risks, however due to the patients daughter concern about previous bad behavior, she would like medication to continue the same.

## 2022-12-14 NOTE — ASSESSMENT & PLAN NOTE
Stable.  Patient tolerated Namenda well without side effects. I feel the benefits of the medication outweigh the risks.

## 2022-12-14 NOTE — ASSESSMENT & PLAN NOTE
Doing well. Patient tolerated Protonix well without side effects. I feel the benefits of the medication outweigh the risks.

## 2022-12-14 NOTE — ASSESSMENT & PLAN NOTE
Stable. Chol 163 up from 155, trig 159 down from 164. HDL 38 down from 40, LDL 97 up from 87  Encouraged to watch fatty intake, exercise more, and lose weight.   compliant with medication  Patient tolerated crestor well without side effects.  Due to his age I discussed possibly stopping the Crestor or decreasing the dose.  Patient's daughter feels since he is doing so well she prefers to do no change.    Is not getting adequate diet and exercise  Goals developed at last visit were  met   Follow up in 3  months  Care management needs are self-addressed. Self-management abilities addressed and patient is capable of managing his own disease.

## 2022-12-14 NOTE — ASSESSMENT & PLAN NOTE
Doing well.  Patient tolerated Singulair well without side effects. I feel the benefits of the medication outweigh the risks.

## 2022-12-14 NOTE — ASSESSMENT & PLAN NOTE
Worse.  Creatinine 1.35 up from 1.17, eGFR 47 down from 56.  Discussed referral to Nephrologist, or backing off on medication or repeating labs in 3 months.  Pt. Declines referral to nephrologist.  Patient prefers to continue medications and repeat lab in 3 months.

## 2022-12-14 NOTE — PROGRESS NOTES
Subjective   Diego Galan is a 89 y.o. male.     Hypertension  This is a chronic problem. The current episode started more than 1 year ago. The problem is unchanged. The problem is controlled. Pertinent negatives include no chest pain, palpitations or shortness of breath. There are no associated agents to hypertension. Risk factors for coronary artery disease include dyslipidemia. The current treatment provides significant improvement. There are no compliance problems.    Hyperlipidemia  This is a chronic problem. The current episode started more than 1 year ago. The problem is controlled. Recent lipid tests were reviewed and are high. Factors aggravating his hyperlipidemia include fatty foods. Pertinent negatives include no chest pain, myalgias or shortness of breath. Current antihyperlipidemic treatment includes statins. The current treatment provides significant improvement of lipids. There are no compliance problems.  Risk factors for coronary artery disease include dyslipidemia and hypertension.   Anemia  Presents for follow-up visit. There has been no palpitations or weight loss. Compliance with medications is %.        The following portions of the patient's history were reviewed and updated as appropriate: allergies, current medications, past family history, past medical history, past social history, past surgical history and problem list.    Family History   Problem Relation Age of Onset   • Heart disease Mother    • Stroke Mother    • Heart disease Father    • Diabetes Brother    • Cancer Brother    • Malig Hyperthermia Neg Hx        Social History     Tobacco Use   • Smoking status: Never   • Smokeless tobacco: Never   Substance Use Topics   • Alcohol use: No   • Drug use: No       Past Surgical History:   Procedure Laterality Date   • CHEST SURGERY      chazapnel   • COLONOSCOPY  2006   • ECTROPION REPAIR Left 6/29/2017    Procedure: LEFT LOWER LID CICATRICIAL ECTROPION OF  REPAIR WITH FULL  THICKNESS SKIN GRAFT, EXCISION LESION 4CM x 2 CM WITH ROTATIONAL FLAP LEFT CHEEK 15 CM x 6 CM WITH FROZEN SECTIONS;  Surgeon: Norman Long MD;  Location: [facility];  Service:    • ECTROPION REPAIR Right 7/6/2017    Procedure: RIGHT LOWER LID CICATRICIAL ECTROPION REPAIR,FULL THICKNESS SKIN GRAFT, SAAVEDRA SUTURE;  Surgeon: Norman Long MD;  Location: [facility];  Service:    • EYE SURGERY      rose marie cataracts  with iol   • FOOT SURGERY Left     war injury   • LEG SURGERY Right     war injury   • MOHS SURGERY      x2       Patient Active Problem List   Diagnosis   • Hypertension, benign   • Type 2 diabetes mellitus with stage 3a chronic kidney disease, without long-term current use of insulin ([facility])   • Acoustic neuroma ([facility])   • Anemia   • Aortic valve stenosis   • 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 ([facility])   • Mixed hyperlipidemia   • Chronic renal failure, stage 3a ([facility])   • Hypertensive left ventricular hypertrophy, 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   • Mitral valve disease   • Noise-induced hearing loss of both ears   • Arthritis   • BPH (benign prostatic hyperplasia)   • GERD (gastroesophageal reflux disease)   • Femoral hernia of left side   • Umbilical hernia without obstruction and without gangrene   • Gait instability   • History of kidney stones   • Elevated PSA   • Chronic nonseasonal allergic rhinitis due to pollen   • Hypernatremia   • Reactive depression   • Urinary incontinence   • Encounter for general adult medical examination with abnormal findings   • AV block, 1st degree   • Ectropion   • Posterior vitreous detachment   • Tear film insufficiency   • Falls   • Diarrhea   • Late onset Alzheimer's dementia with behavioral disturbance ([facility])   • Fecal incontinence   • Edema       Current Outpatient Medications on File Prior to Visit   Medication Sig Dispense Refill   • aspirin 81 MG EC tablet Take 1 tablet by mouth Daily. 30 tablet 5   • [DISCONTINUED]  galantamine (RAZADYNE) 4 MG tablet TAKE 1 TABLET TWICE A  tablet 1   • [DISCONTINUED] memantine (NAMENDA) 5 MG tablet TAKE 1 TABLET TWICE A  tablet 1     No current facility-administered medications on file prior to visit.       No Known Allergies    Review of Systems   Constitutional: Negative for fatigue, unexpected weight gain and unexpected weight loss.   Respiratory: Negative for shortness of breath.    Cardiovascular: Negative for chest pain, palpitations and leg swelling.   Gastrointestinal: Negative for nausea.   Musculoskeletal: Negative for myalgias.   Skin: Negative for dry skin.   Neurological: Negative for headache.       Objective   Visit Vitals  /60 (BP Location: Right arm, Patient Position: Sitting, Cuff Size: Adult)   Pulse 99   Temp 96.8 °F (36 °C) (Temporal)   Resp 18   Wt 70.4 kg (155 lb 3.2 oz)   SpO2 92%   BMI 23.60 kg/m²     Physical Exam  Vitals and nursing note reviewed.   Constitutional:       Appearance: He is well-developed.   HENT:      Head: Normocephalic.   Neck:      Thyroid: No thyromegaly.      Vascular: No carotid bruit.      Trachea: Trachea normal.   Cardiovascular:      Rate and Rhythm: Normal rate and regular rhythm.      Heart sounds: No murmur heard.    No friction rub. No gallop.   Pulmonary:      Effort: Pulmonary effort is normal. No respiratory distress.      Breath sounds: Normal breath sounds. No wheezing.   Chest:      Chest wall: No tenderness.   Musculoskeletal:      Cervical back: Neck supple.   Skin:     General: Skin is dry.      Findings: No rash.      Nails: There is no clubbing.   Neurological:      Mental Status: He is alert and oriented to person, place, and time.   Psychiatric:         Behavior: Behavior is cooperative.           Assessment & Plan .  Problem List Items Addressed This Visit        High    Late onset Alzheimer's dementia with behavioral disturbance (HCC)    Current Assessment & Plan     Stable.  Patient tolerated Namenda  well without side effects. I feel the benefits of the medication outweigh the risks.         Relevant Medications    divalproex (DEPAKOTE) 125 MG DR tablet    QUEtiapine (SEROquel) 25 MG tablet       Medium    Anemia    Current Assessment & Plan     Stable Hgb 11.5 same as previous         Relevant Orders    CBC & Differential    Chronic renal failure, stage 3a (HCC)    Current Assessment & Plan     Worse.  Creatinine 1.35 up from 1.17, eGFR 47 down from 56.  Discussed referral to Nephrologist, or backing off on medication or repeating labs in 3 months.  Pt. Declines referral to nephrologist.  Patient prefers to continue medications and repeat lab in 3 months.         GERD (gastroesophageal reflux disease)    Current Assessment & Plan     Doing well. Patient tolerated Protonix well without side effects. I feel the benefits of the medication outweigh the risks.         Relevant Medications    pantoprazole (PROTONIX) 40 MG EC tablet    Hypertension, benign - Primary    Current Assessment & Plan     Doing well.  Encouraged to watch salt, exercise more and lose weight.  No medication         Mixed hyperlipidemia    Current Assessment & Plan     Stable. Chol 163 up from 155, trig 159 down from 164. HDL 38 down from 40, LDL 97 up from 87  Encouraged to watch fatty intake, exercise more, and lose weight.   compliant with medication  Patient tolerated crestor well without side effects.  Due to his age I discussed possibly stopping the Crestor or decreasing the dose.  Patient's daughter feels since he is doing so well she prefers to do no change.    Is not getting adequate diet and exercise  Goals developed at last visit were  met   Follow up in 3  months  Care management needs are self-addressed. Self-management abilities addressed and patient is capable of managing his own disease.           Relevant Medications    rosuvastatin (CRESTOR) 40 MG tablet    Other Relevant Orders    Lipid Panel With / Chol / HDL Ratio     Comprehensive metabolic panel    Type 2 diabetes mellitus with stage 3a chronic kidney disease, without long-term current use of insulin ([facility])    Current Assessment & Plan     Last A1c was 5.6 on 8-, patient advised to repeat in 3 months.  Encourged to watch sugar intake, exercise more, lose weight.  Will order labs today and patient will return for results and shared decision making in 3-2023.              Relevant Orders    Hemoglobin A1c       Low    Chronic nonseasonal allergic rhinitis due to pollen    Current Assessment & Plan     Doing well.  Patient tolerated Singulair well without side effects. I feel the benefits of the medication outweigh the risks.         Relevant Medications    montelukast (SINGULAIR) 10 MG tablet       Unprioritized    Hypernatremia    Current Assessment & Plan     Worsening; Sodium 145 up from 143.  Advised to increase water intake.         Reactive depression    Current Assessment & Plan     Stable.  Patient tolerated Depakote and Seroquel well without side effects. I question if the benefits of the medication outweigh the risks, however due to the patients daughter concern about previous bad behavior, she would like medication to continue the same.         Relevant Medications    divalproex (DEPAKOTE) 125 MG DR tablet    QUEtiapine (SEROquel) 25 MG tablet

## 2022-12-22 NOTE — TELEPHONE ENCOUNTER
marlin from [facility] called back wanting to know if we were able to get an answer yet call back [phone]

## 2022-12-22 NOTE — TELEPHONE ENCOUNTER
Chasity with [facility] called requesting orders for hospice care and to find out if Dr Milner would like to follow.

## 2022-12-27 NOTE — ASSESSMENT & PLAN NOTE
Last A1c was 5.6 on 8-, patient advised to repeat in 3 months.  Encourged to watch sugar intake, exercise more, lose weight.  Will order labs today and patient will return for results and shared decision making in 3-2023.

## 2022-12-28 NOTE — TELEPHONE ENCOUNTER
Caller: Mirna Wick    Relationship: Emergency Contact    Best call back number: [phone]  Requested Prescriptions:   Requested Prescriptions     Pending Prescriptions Disp Refills   • galantamine (RAZADYNE) 4 MG tablet 180 tablet 1     Sig: Take 1 tablet by mouth 2 (Two) Times a Day.   • memantine (NAMENDA) 5 MG tablet 180 tablet 1     Sig: Take 1 tablet by mouth 2 (Two) Times a Day.        Pharmacy where request should be sent: [id number] - [place], [place] - [address] [place] [address] [phone] [facility] [phone] [facility]     Does the patient have less than a 3 day supply:  [x] Yes  [] No    Would you like a call back once the refill request has been completed: [x] Yes [] No    If the office needs to give you a call back, can they leave a voicemail: [x] Yes [] No    Adriana Buckley Rep   12/28/22 10:41 EST

## 2023-08-24 NOTE — ASSESSMENT & PLAN NOTE
Borderline poor control; Encouraged to watch salt, exercise more and lose weight.     friend/significant other

## 2024-10-28 NOTE — PROGRESS NOTES
Subjective   Diego Galan is a 89 y.o. male.     Leg Swelling   This is a chronic problem. The current episode started more than 1 month ago. The problem occurs constantly. The problem has been resolved. Pertinent negatives include no chest pain, chills, diaphoresis, fatigue or fever. He has tried walking (elevating feet) for the symptoms. The treatment provided significant relief.   Hypertension   This is a chronic problem. The problem has been gradually improving since onset. Pertinent negatives include no chest pain, palpitations or shortness of breath. Risk factors for coronary artery disease include dyslipidemia. Past treatments include diuretics. The current treatment provides significant improvement.        The following portions of the patient's history were reviewed and updated as appropriate: allergies, current medications, past family history, past medical history, past social history, past surgical history and problem list.    Family History   Problem Relation Age of Onset   • Heart disease Mother    • Stroke Mother    • Heart disease Father    • Diabetes Brother    • Cancer Brother    • Malig Hyperthermia Neg Hx        Social History     Tobacco Use   • Smoking status: Never Smoker   • Smokeless tobacco: Never Used   Substance Use Topics   • Alcohol use: No   • Drug use: No       Past Surgical History:   Procedure Laterality Date   • CHEST SURGERY      shrapnel   • COLONOSCOPY     • ECTROPION REPAIR Left 6/29/2017    Procedure: LEFT LOWER LID CICATRICIAL ECTROPION OF  REPAIR WITH FULL THICKNESS SKIN GRAFT, EXCISION LESION 4CM x 2 CM WITH ROTATIONAL FLAP LEFT CHEEK 15 CM x 6 CM WITH FROZEN SECTIONS;  Surgeon: Norman Long MD;  Location: [facility];  Service:    • ECTROPION REPAIR Right 7/6/2017    Procedure: RIGHT LOWER LID CICATRICIAL ECTROPION REPAIR,FULL THICKNESS SKIN GRAFT, SAAVEDRA SUTURE;  Surgeon: Norman Long MD;  Location: [facility] OR [facility] [place];  Service:    • EYE SURGERY      rose marie  cataracts  with iol   • FOOT SURGERY Left     war injury   • LEG SURGERY Right     war injury   • MOHS SURGERY      x2       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)   • Mixed hyperlipidemia   • Chronic renal failure, unspecified stage   • Hypertensive left ventricular hypertrophy, 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   • Memory loss   • Mitral valve disease   • Noise-induced hearing loss of both ears   • Lethargy   • Arthritis   • BPH (benign prostatic hyperplasia)   • GERD (gastroesophageal reflux disease)   • Femoral hernia of left side   • Umbilical hernia without obstruction and without gangrene   • Gait instability   • History of kidney stones   • Elevated PSA   • Knee pain   • Late onset Alzheimer's disease without behavioral disturbance (CMS/HCC)   • Chronic nonseasonal allergic rhinitis due to pollen   • Annual physical exam   • Hypernatremia   • 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     • carboxymethylcellulose (REFRESH PLUS) 0.5 % solution 1 drop 2 (Two) Times a Day As Needed for Dry Eyes.     • divalproex (DEPAKOTE) 125 MG DR tablet TAKE 1 TABLET DAILY 90 tablet 1   • erythromycin (ROMYCIN) 5 MG/GM ophthalmic ointment Place a 1/2 inch ribbon of ointment on the sutures of face and behind the ear two times daily 3.5 g 1   • fluticasone (FLONASE) 50 MCG/ACT nasal spray 2 sprays into each nostril Every Morning.     • galantamine (RAZADYNE) 4 MG tablet TAKE 1 TABLET TWICE A  tablet 1   • memantine (NAMENDA) 5 MG tablet TAKE 1 TABLET TWICE A   "tablet 1   • montelukast (SINGULAIR) 10 MG tablet TAKE 1 TABLET DAILY 90 tablet 3   • pantoprazole (PROTONIX) 40 MG EC tablet Take 1 tablet by mouth Daily. 90 tablet 3   • potassium chloride (K-DUR) 10 MEQ CR tablet Take 1 tablet by mouth Every Morning. 90 tablet 3   • QUEtiapine (SEROquel) 25 MG tablet TAKE 1 TABLET AT BEDTIME 90 tablet 1   • rosuvastatin (CRESTOR) 40 MG tablet Take 1 tablet by mouth 2 (Two) Times a Week. Monday thursday 90 tablet 3   • HYDROcodone-acetaminophen (NORCO) 5-325 MG per tablet Take 1 tablet by mouth Every 6 (Six) Hours As Needed for Moderate Pain (4-6) (pain). 15 tablet 0   • HYDROcodone-acetaminophen (NORCO) 5-325 MG per tablet Take 1 tablet by mouth Every 6 (Six) Hours As Needed for Moderate Pain (4-6) (pain). 15 tablet 0   • Loratadine (CLARITIN PO) Take 1 tablet by mouth Every Morning.       No current facility-administered medications on file prior to visit.        No Known Allergies    Review of Systems   Constitutional: Negative for chills, diaphoresis, fatigue, fever, unexpected weight gain and unexpected weight loss.   Respiratory: Negative for shortness of breath.    Cardiovascular: Positive for leg swelling (resolved). Negative for chest pain and palpitations.   Neurological: Negative for headache.       Objective   Visit Vitals  /80 (BP Location: Right arm, Patient Position: Sitting, Cuff Size: Adult)   Pulse 80   Temp 96.8 °F (36 °C) (Oral)   Resp 18   Ht 172.7 cm (68\")   Wt 74.8 kg (164 lb 12.8 oz)   SpO2 100%   BMI 25.06 kg/m²     Physical Exam   Constitutional: He is oriented to person, place, and time. He appears well-developed and well-nourished. He is cooperative.   HENT:   Head: Normocephalic.   Neck: Trachea normal. Neck supple. Carotid bruit is not present. No thyromegaly present.   Cardiovascular: Normal rate and regular rhythm. Exam reveals no gallop and no friction rub.   No murmur heard.  Pulmonary/Chest: Effort normal and breath sounds normal. No " respiratory distress. He has no wheezes. He exhibits no tenderness.   Neurological: He is alert and oriented to person, place, and time.   Skin: Skin is dry. No rash noted. Nails show no clubbing.   Nursing note and vitals reviewed.        Assessment/Plan .  Problem List Items Addressed This Visit        Medium    Edema of both legs    Current Assessment & Plan     Resolved         Hypertension - Primary    Current Assessment & Plan     Doing well--  Encouraged to watch salt, exercise more and lose weight.           Relevant Medications    hydroCHLOROthiazide (HYDRODIURIL) 25 MG tablet       Unprioritized    Reactive depression    Current Assessment & Plan     New dx.  2nd to COVID 19 and Pt. Not able to sit outside due to [employer] [place] rules because of COVID 19                     no

## 2024-11-30 NOTE — ASSESSMENT & PLAN NOTE
Stable. Labs done 9-, read by me, reviewed with pt.  Hematocrit 36.6 down from 37.6,  Hemoglobin 12.2 up from 12.1  Declines more work-up repeat with next labs   normal/cranial nerves II-XII intact/sensation intact negative

## (undated) DEVICE — SUT SILK G3 DBL/ARM 4/0 18IN BLK

## (undated) DEVICE — EYE PAD,OVAL: Brand: CURITY

## (undated) DEVICE — PK ENT 40

## (undated) DEVICE — 3M™ STERI-STRIP™ COMPOUND BENZOIN TINCTURE 40 BAGS/CARTON 4 CARTONS/CASE C1544: Brand: 3M™ STERI-STRIP™

## (undated) DEVICE — STERILE COTTON TIP 6IN 10PK: Brand: MEDLINE

## (undated) DEVICE — ELECTRD BLD EZ CLN MOD 2.5IN

## (undated) DEVICE — SUT GUT PLN FAST ABS 5/0 PC1 18IN 1915G

## (undated) DEVICE — LAB CORP ERYTHROMYCIN 15 MCG

## (undated) DEVICE — GOWN,NON-REINFORCED,SIRUS,SET IN SLV,XXL: Brand: MEDLINE

## (undated) DEVICE — SUT VIC 5/0 P3 18IN J493G

## (undated) DEVICE — SUT VIC 6/0 P3 18IN J492G

## (undated) DEVICE — CROUCH CORNEAL PROTECTOR: Brand: BAUSCH + LOMB

## (undated) DEVICE — NDL HYPO PRECISIONGLIDE REG 25G 1 1/2

## (undated) DEVICE — DRSNG TELFA PAD NONADH STR 1S 3X4IN

## (undated) DEVICE — GLV SURG BIOGEL ECLPS LTX PF 7.5

## (undated) DEVICE — SWABSTCK BENZOIN TINCTURE

## (undated) DEVICE — GAUZE,SPONGE,4"X4",16PLY,XRAY,STRL,LF: Brand: MEDLINE

## (undated) DEVICE — MARKR SKIN W/RULR AND LBL

## (undated) DEVICE — TRY SKINPREP DRYPREP

## (undated) DEVICE — SWABSTK SKINPREP PVPI LF PK/3

## (undated) DEVICE — GLV SURG BIOGEL SENSR LTX PF SZ7.5

## (undated) DEVICE — STERILE COTTON BALLS LARGE 5/P: Brand: MEDLINE

## (undated) DEVICE — 1010 S-DRAPE TOWEL DRAPE 10/BX: Brand: STERI-DRAPE™

## (undated) DEVICE — CONTAINER,SPECIMEN,OR STERILE,4OZ: Brand: MEDLINE

## (undated) DEVICE — WIPE INST MEROCEL

## (undated) DEVICE — SOL BETADINE 4OZ

## (undated) DEVICE — STERILE TOOTHPICK SET: Brand: CENTURION

## (undated) DEVICE — DRSNG TELFA ILND ADH 4X6IN

## (undated) DEVICE — SUT SILK 2/0 SH 30IN K833H